# Patient Record
Sex: MALE | Race: WHITE
[De-identification: names, ages, dates, MRNs, and addresses within clinical notes are randomized per-mention and may not be internally consistent; named-entity substitution may affect disease eponyms.]

---

## 2020-11-16 ENCOUNTER — HOSPITAL ENCOUNTER (OUTPATIENT)
Age: 80
End: 2020-11-16
Payer: SELF-PAY

## 2020-11-16 DIAGNOSIS — M99.03: Primary | ICD-10-CM

## 2020-11-16 DIAGNOSIS — S33.5XXA: ICD-10-CM

## 2020-11-16 PROCEDURE — 72148 MRI LUMBAR SPINE W/O DYE: CPT

## 2021-06-10 ENCOUNTER — APPOINTMENT (OUTPATIENT)
Dept: CT IMAGING | Age: 81
DRG: 062 | End: 2021-06-10
Payer: MEDICARE

## 2021-06-10 ENCOUNTER — APPOINTMENT (OUTPATIENT)
Dept: GENERAL RADIOLOGY | Age: 81
DRG: 062 | End: 2021-06-10
Payer: MEDICARE

## 2021-06-10 ENCOUNTER — HOSPITAL ENCOUNTER (INPATIENT)
Age: 81
LOS: 5 days | Discharge: NURSING, REHAB OR LONG-TERM CARE | DRG: 062 | End: 2021-06-15
Attending: EMERGENCY MEDICINE | Admitting: INTERNAL MEDICINE
Payer: MEDICARE

## 2021-06-10 DIAGNOSIS — I65.23 STENOSIS OF BOTH INTERNAL CAROTID ARTERIES: ICD-10-CM

## 2021-06-10 DIAGNOSIS — I63.9 CEREBROVASCULAR ACCIDENT (CVA), UNSPECIFIED MECHANISM (HCC): Primary | ICD-10-CM

## 2021-06-10 LAB
ACETAMINOPHEN LEVEL: <5 MCG/ML (ref 10–30)
ALBUMIN SERPL-MCNC: 4 G/DL (ref 3.5–5.2)
ALP BLD-CCNC: 96 U/L (ref 40–129)
ALT SERPL-CCNC: 14 U/L (ref 0–40)
AMPHETAMINE SCREEN, URINE: NOT DETECTED
ANION GAP SERPL CALCULATED.3IONS-SCNC: 12 MMOL/L (ref 7–16)
APTT: 27.2 SEC (ref 24.5–35.1)
AST SERPL-CCNC: 22 U/L (ref 0–39)
BARBITURATE SCREEN URINE: NOT DETECTED
BASOPHILS ABSOLUTE: 0.06 E9/L (ref 0–0.2)
BASOPHILS RELATIVE PERCENT: 0.7 % (ref 0–2)
BENZODIAZEPINE SCREEN, URINE: NOT DETECTED
BILIRUB SERPL-MCNC: 0.4 MG/DL (ref 0–1.2)
BILIRUBIN URINE: NEGATIVE
BLOOD, URINE: NEGATIVE
BUN BLDV-MCNC: 21 MG/DL (ref 6–23)
CALCIUM SERPL-MCNC: 9.4 MG/DL (ref 8.6–10.2)
CANNABINOID SCREEN URINE: NOT DETECTED
CHLORIDE BLD-SCNC: 102 MMOL/L (ref 98–107)
CLARITY: CLEAR
CO2: 25 MMOL/L (ref 22–29)
COCAINE METABOLITE SCREEN URINE: NOT DETECTED
COLOR: YELLOW
CREAT SERPL-MCNC: 1.2 MG/DL (ref 0.7–1.2)
EKG ATRIAL RATE: 74 BPM
EKG P AXIS: 53 DEGREES
EKG P-R INTERVAL: 168 MS
EKG Q-T INTERVAL: 414 MS
EKG QRS DURATION: 126 MS
EKG QTC CALCULATION (BAZETT): 459 MS
EKG R AXIS: -18 DEGREES
EKG T AXIS: 23 DEGREES
EKG VENTRICULAR RATE: 74 BPM
EOSINOPHILS ABSOLUTE: 0.06 E9/L (ref 0.05–0.5)
EOSINOPHILS RELATIVE PERCENT: 0.7 % (ref 0–6)
ETHANOL: <10 MG/DL (ref 0–0.08)
FENTANYL SCREEN, URINE: NOT DETECTED
GFR AFRICAN AMERICAN: >60
GFR NON-AFRICAN AMERICAN: 58 ML/MIN/1.73
GLUCOSE BLD-MCNC: 95 MG/DL (ref 74–99)
GLUCOSE URINE: NEGATIVE MG/DL
HCT VFR BLD CALC: 44.4 % (ref 37–54)
HEMOGLOBIN: 14.3 G/DL (ref 12.5–16.5)
IMMATURE GRANULOCYTES #: 0.06 E9/L
IMMATURE GRANULOCYTES %: 0.7 % (ref 0–5)
INR BLD: 1.1
KETONES, URINE: NEGATIVE MG/DL
LEUKOCYTE ESTERASE, URINE: NEGATIVE
LYMPHOCYTES ABSOLUTE: 1.23 E9/L (ref 1.5–4)
LYMPHOCYTES RELATIVE PERCENT: 14.2 % (ref 20–42)
Lab: NORMAL
MAGNESIUM: 2.1 MG/DL (ref 1.6–2.6)
MCH RBC QN AUTO: 29.7 PG (ref 26–35)
MCHC RBC AUTO-ENTMCNC: 32.2 % (ref 32–34.5)
MCV RBC AUTO: 92.3 FL (ref 80–99.9)
METER GLUCOSE: 94 MG/DL (ref 74–99)
METHADONE SCREEN, URINE: NOT DETECTED
MONOCYTES ABSOLUTE: 0.95 E9/L (ref 0.1–0.95)
MONOCYTES RELATIVE PERCENT: 10.9 % (ref 2–12)
NEUTROPHILS ABSOLUTE: 6.32 E9/L (ref 1.8–7.3)
NEUTROPHILS RELATIVE PERCENT: 72.8 % (ref 43–80)
NITRITE, URINE: NEGATIVE
OPIATE SCREEN URINE: NOT DETECTED
OXYCODONE URINE: NOT DETECTED
PDW BLD-RTO: 12.8 FL (ref 11.5–15)
PH UA: 7 (ref 5–9)
PHENCYCLIDINE SCREEN URINE: NOT DETECTED
PLATELET # BLD: 331 E9/L (ref 130–450)
PMV BLD AUTO: 9.7 FL (ref 7–12)
POTASSIUM SERPL-SCNC: 4.6 MMOL/L (ref 3.5–5)
PROCALCITONIN: 0.07 NG/ML (ref 0–0.08)
PROTEIN UA: NEGATIVE MG/DL
PROTHROMBIN TIME: 11.5 SEC (ref 9.3–12.4)
RBC # BLD: 4.81 E12/L (ref 3.8–5.8)
SALICYLATE, SERUM: <0.3 MG/DL (ref 0–30)
SARS-COV-2, NAAT: NOT DETECTED
SODIUM BLD-SCNC: 139 MMOL/L (ref 132–146)
SPECIFIC GRAVITY UA: 1.01 (ref 1–1.03)
TOTAL PROTEIN: 7.2 G/DL (ref 6.4–8.3)
TRICYCLIC ANTIDEPRESSANTS SCREEN SERUM: NEGATIVE NG/ML
TROPONIN, HIGH SENSITIVITY: 34 NG/L (ref 0–11)
TROPONIN, HIGH SENSITIVITY: 38 NG/L (ref 0–11)
UROBILINOGEN, URINE: 0.2 E.U./DL
WBC # BLD: 8.7 E9/L (ref 4.5–11.5)

## 2021-06-10 PROCEDURE — 84484 ASSAY OF TROPONIN QUANT: CPT

## 2021-06-10 PROCEDURE — 80143 DRUG ASSAY ACETAMINOPHEN: CPT

## 2021-06-10 PROCEDURE — 99284 EMERGENCY DEPT VISIT MOD MDM: CPT

## 2021-06-10 PROCEDURE — 0042T CT BRAIN PERFUSION: CPT | Performed by: RADIOLOGY

## 2021-06-10 PROCEDURE — 84145 PROCALCITONIN (PCT): CPT

## 2021-06-10 PROCEDURE — 6360000002 HC RX W HCPCS

## 2021-06-10 PROCEDURE — 85610 PROTHROMBIN TIME: CPT

## 2021-06-10 PROCEDURE — 70498 CT ANGIOGRAPHY NECK: CPT | Performed by: RADIOLOGY

## 2021-06-10 PROCEDURE — 2000000000 HC ICU R&B

## 2021-06-10 PROCEDURE — 83735 ASSAY OF MAGNESIUM: CPT

## 2021-06-10 PROCEDURE — 0042T CT BRAIN PERFUSION: CPT

## 2021-06-10 PROCEDURE — 70496 CT ANGIOGRAPHY HEAD: CPT

## 2021-06-10 PROCEDURE — 80053 COMPREHEN METABOLIC PANEL: CPT

## 2021-06-10 PROCEDURE — 82077 ASSAY SPEC XCP UR&BREATH IA: CPT

## 2021-06-10 PROCEDURE — 2580000003 HC RX 258

## 2021-06-10 PROCEDURE — 6370000000 HC RX 637 (ALT 250 FOR IP): Performed by: INTERNAL MEDICINE

## 2021-06-10 PROCEDURE — 93010 ELECTROCARDIOGRAM REPORT: CPT | Performed by: INTERNAL MEDICINE

## 2021-06-10 PROCEDURE — 36415 COLL VENOUS BLD VENIPUNCTURE: CPT

## 2021-06-10 PROCEDURE — 82962 GLUCOSE BLOOD TEST: CPT

## 2021-06-10 PROCEDURE — 80307 DRUG TEST PRSMV CHEM ANLYZR: CPT

## 2021-06-10 PROCEDURE — 71045 X-RAY EXAM CHEST 1 VIEW: CPT

## 2021-06-10 PROCEDURE — 2500000003 HC RX 250 WO HCPCS: Performed by: EMERGENCY MEDICINE

## 2021-06-10 PROCEDURE — 70498 CT ANGIOGRAPHY NECK: CPT

## 2021-06-10 PROCEDURE — 85730 THROMBOPLASTIN TIME PARTIAL: CPT

## 2021-06-10 PROCEDURE — 87635 SARS-COV-2 COVID-19 AMP PRB: CPT

## 2021-06-10 PROCEDURE — 6360000002 HC RX W HCPCS: Performed by: EMERGENCY MEDICINE

## 2021-06-10 PROCEDURE — 6360000002 HC RX W HCPCS: Performed by: INTERNAL MEDICINE

## 2021-06-10 PROCEDURE — 85025 COMPLETE CBC W/AUTO DIFF WBC: CPT

## 2021-06-10 PROCEDURE — 70450 CT HEAD/BRAIN W/O DYE: CPT

## 2021-06-10 PROCEDURE — 6360000004 HC RX CONTRAST MEDICATION: Performed by: RADIOLOGY

## 2021-06-10 PROCEDURE — 99291 CRITICAL CARE FIRST HOUR: CPT | Performed by: PSYCHIATRY & NEUROLOGY

## 2021-06-10 PROCEDURE — 70496 CT ANGIOGRAPHY HEAD: CPT | Performed by: RADIOLOGY

## 2021-06-10 PROCEDURE — 2580000003 HC RX 258: Performed by: INTERNAL MEDICINE

## 2021-06-10 PROCEDURE — 2500000003 HC RX 250 WO HCPCS

## 2021-06-10 PROCEDURE — 80179 DRUG ASSAY SALICYLATE: CPT

## 2021-06-10 PROCEDURE — 93005 ELECTROCARDIOGRAM TRACING: CPT | Performed by: EMERGENCY MEDICINE

## 2021-06-10 PROCEDURE — C9113 INJ PANTOPRAZOLE SODIUM, VIA: HCPCS | Performed by: INTERNAL MEDICINE

## 2021-06-10 PROCEDURE — 81003 URINALYSIS AUTO W/O SCOPE: CPT

## 2021-06-10 PROCEDURE — 2580000003 HC RX 258: Performed by: EMERGENCY MEDICINE

## 2021-06-10 PROCEDURE — 96374 THER/PROPH/DIAG INJ IV PUSH: CPT

## 2021-06-10 RX ORDER — SODIUM CHLORIDE 9 MG/ML
25 INJECTION, SOLUTION INTRAVENOUS PRN
Status: DISCONTINUED | OUTPATIENT
Start: 2021-06-10 | End: 2021-06-10

## 2021-06-10 RX ORDER — LABETALOL HYDROCHLORIDE 5 MG/ML
5 INJECTION, SOLUTION INTRAVENOUS ONCE
Status: COMPLETED | OUTPATIENT
Start: 2021-06-10 | End: 2021-06-10

## 2021-06-10 RX ORDER — ERGOCALCIFEROL 1.25 MG/1
50000 CAPSULE ORAL WEEKLY
COMMUNITY

## 2021-06-10 RX ORDER — 0.9 % SODIUM CHLORIDE 0.9 %
50 INTRAVENOUS SOLUTION INTRAVENOUS ONCE
Status: DISCONTINUED | OUTPATIENT
Start: 2021-06-10 | End: 2021-06-10

## 2021-06-10 RX ORDER — SODIUM CHLORIDE 0.9 % (FLUSH) 0.9 %
5-40 SYRINGE (ML) INJECTION PRN
Status: DISCONTINUED | OUTPATIENT
Start: 2021-06-10 | End: 2021-06-11

## 2021-06-10 RX ORDER — SODIUM CHLORIDE 9 MG/ML
25 INJECTION, SOLUTION INTRAVENOUS PRN
Status: DISCONTINUED | OUTPATIENT
Start: 2021-06-10 | End: 2021-06-15 | Stop reason: HOSPADM

## 2021-06-10 RX ORDER — PANTOPRAZOLE SODIUM 40 MG/10ML
40 INJECTION, POWDER, LYOPHILIZED, FOR SOLUTION INTRAVENOUS DAILY
Status: DISCONTINUED | OUTPATIENT
Start: 2021-06-10 | End: 2021-06-12

## 2021-06-10 RX ORDER — ONDANSETRON 2 MG/ML
4 INJECTION INTRAMUSCULAR; INTRAVENOUS EVERY 6 HOURS PRN
Status: DISCONTINUED | OUTPATIENT
Start: 2021-06-10 | End: 2021-06-15 | Stop reason: HOSPADM

## 2021-06-10 RX ORDER — 0.9 % SODIUM CHLORIDE 0.9 %
50 INTRAVENOUS SOLUTION INTRAVENOUS ONCE
Status: COMPLETED | OUTPATIENT
Start: 2021-06-10 | End: 2021-06-10

## 2021-06-10 RX ORDER — HYDRALAZINE HYDROCHLORIDE 20 MG/ML
10 INJECTION INTRAMUSCULAR; INTRAVENOUS EVERY 30 MIN PRN
Status: DISCONTINUED | OUTPATIENT
Start: 2021-06-10 | End: 2021-06-15 | Stop reason: HOSPADM

## 2021-06-10 RX ORDER — POLYETHYLENE GLYCOL 3350 17 G/17G
17 POWDER, FOR SOLUTION ORAL DAILY PRN
Status: DISCONTINUED | OUTPATIENT
Start: 2021-06-10 | End: 2021-06-10

## 2021-06-10 RX ORDER — DEXTROSE MONOHYDRATE 25 G/50ML
12.5 INJECTION, SOLUTION INTRAVENOUS
Status: DISCONTINUED | OUTPATIENT
Start: 2021-06-10 | End: 2021-06-10

## 2021-06-10 RX ORDER — ATORVASTATIN CALCIUM 40 MG/1
80 TABLET, FILM COATED ORAL NIGHTLY
Status: DISCONTINUED | OUTPATIENT
Start: 2021-06-10 | End: 2021-06-15 | Stop reason: HOSPADM

## 2021-06-10 RX ORDER — SENNA AND DOCUSATE SODIUM 50; 8.6 MG/1; MG/1
1 TABLET, FILM COATED ORAL 2 TIMES DAILY
Status: DISCONTINUED | OUTPATIENT
Start: 2021-06-10 | End: 2021-06-15 | Stop reason: HOSPADM

## 2021-06-10 RX ORDER — SODIUM CHLORIDE 9 MG/ML
10 INJECTION INTRAVENOUS DAILY
Status: DISCONTINUED | OUTPATIENT
Start: 2021-06-10 | End: 2021-06-12

## 2021-06-10 RX ORDER — LABETALOL HYDROCHLORIDE 5 MG/ML
10 INJECTION, SOLUTION INTRAVENOUS EVERY 30 MIN PRN
Status: DISCONTINUED | OUTPATIENT
Start: 2021-06-10 | End: 2021-06-15 | Stop reason: HOSPADM

## 2021-06-10 RX ORDER — SODIUM CHLORIDE 0.9 % (FLUSH) 0.9 %
10 SYRINGE (ML) INJECTION
Status: ACTIVE | OUTPATIENT
Start: 2021-06-10 | End: 2021-06-10

## 2021-06-10 RX ORDER — HYDROCHLOROTHIAZIDE 25 MG/1
25 TABLET ORAL DAILY
COMMUNITY

## 2021-06-10 RX ORDER — DEXTROSE MONOHYDRATE 25 G/50ML
12.5 INJECTION, SOLUTION INTRAVENOUS
Status: ACTIVE | OUTPATIENT
Start: 2021-06-10 | End: 2021-06-10

## 2021-06-10 RX ORDER — SODIUM CHLORIDE 0.9 % (FLUSH) 0.9 %
5-40 SYRINGE (ML) INJECTION EVERY 12 HOURS SCHEDULED
Status: DISCONTINUED | OUTPATIENT
Start: 2021-06-10 | End: 2021-06-11

## 2021-06-10 RX ORDER — POLYETHYLENE GLYCOL 3350 17 G/17G
17 POWDER, FOR SOLUTION ORAL DAILY
Status: DISCONTINUED | OUTPATIENT
Start: 2021-06-10 | End: 2021-06-15 | Stop reason: HOSPADM

## 2021-06-10 RX ORDER — SODIUM CHLORIDE 0.9 % (FLUSH) 0.9 %
5-40 SYRINGE (ML) INJECTION EVERY 12 HOURS SCHEDULED
Status: DISCONTINUED | OUTPATIENT
Start: 2021-06-10 | End: 2021-06-15 | Stop reason: HOSPADM

## 2021-06-10 RX ORDER — ONDANSETRON 4 MG/1
4 TABLET, ORALLY DISINTEGRATING ORAL EVERY 8 HOURS PRN
Status: DISCONTINUED | OUTPATIENT
Start: 2021-06-10 | End: 2021-06-15 | Stop reason: HOSPADM

## 2021-06-10 RX ORDER — SODIUM CHLORIDE 0.9 % (FLUSH) 0.9 %
5-40 SYRINGE (ML) INJECTION PRN
Status: DISCONTINUED | OUTPATIENT
Start: 2021-06-10 | End: 2021-06-15 | Stop reason: HOSPADM

## 2021-06-10 RX ADMIN — SODIUM CHLORIDE, PRESERVATIVE FREE 10 ML: 5 INJECTION INTRAVENOUS at 20:30

## 2021-06-10 RX ADMIN — SODIUM CHLORIDE 50 ML: 9 INJECTION, SOLUTION INTRAVENOUS at 13:53

## 2021-06-10 RX ADMIN — LABETALOL HYDROCHLORIDE 5 MG: 5 INJECTION INTRAVENOUS at 12:48

## 2021-06-10 RX ADMIN — PANTOPRAZOLE SODIUM 40 MG: 40 INJECTION, POWDER, FOR SOLUTION INTRAVENOUS at 20:30

## 2021-06-10 RX ADMIN — IOPAMIDOL 100 ML: 755 INJECTION, SOLUTION INTRAVENOUS at 12:02

## 2021-06-10 RX ADMIN — ATORVASTATIN CALCIUM 80 MG: 40 TABLET, FILM COATED ORAL at 20:29

## 2021-06-10 RX ADMIN — ALTEPLASE 80.8 MG: KIT at 12:55

## 2021-06-10 ASSESSMENT — PAIN SCALES - GENERAL
PAINLEVEL_OUTOF10: 0
PAINLEVEL_OUTOF10: 0

## 2021-06-10 NOTE — PROGRESS NOTES
Patient seen in ED room 1 by Dr Billy Ariza and myself regarding the order for carotid stent. Risks and benefits of procedure were discussed with the patient as well as other surgical options. Patient was unsure what he would like to do at this time and states that his wife and son will be here in about an hour to two hours and he would like to discuss it with them first. We will follow up on patient's decision.

## 2021-06-10 NOTE — H&P
Inpatient H&P      PCP:  No primary care provider on file. Admitting Physician:  No admitting provider for patient encounter. Consultants: Critical care, interventional radiology. Chief Complaint:    Chief Complaint   Patient presents with    Extremity Weakness     left arm/ left leg feels heavy. LKW per EMS 56       History of Present Illness  Mustapha Sinha is a 80 y.o. male who presents to [facility] ER complaining of left-sided weakness. Mustapha Sinha has a past medical history that includes hypertension, sciatica, lung nodule    Sylvanus patient presented to the ER with sudden onset of left-sided weakness and numbness which began earlier this morning around 1030. He was brought to the ER. Stroke alert was called he has a history of hypertension. In the ER he had an NIH stroke score of 7. CT of the head showed findings suspicious for small region of ischemia in the right temporal lobe. CTA showed right carotid stenosis of 90%. Chest x-ray showed bibasilar pulmonary opacities which may represent atelectasis. He denies any chest pain or shortness of breath. He was given tPA in the ER. He will be admitted to neuro ICU. Plans are for possibly carotid stent. ER Course  Upon presentation to the ER, routine labwork was performed which revealed troponin of 34. Imaging results are as outlined below in the Imaging section of this note. EKG revealed normal sinus rhythm, right bundle branch block, no prior EKGs. Upon arrival to the ER, patient was 188/94 and tachycardic at 100. .  The patient received tPA in the emergency room and was admitted to ICU under the care of [place] [id number] Jose Robertson Admission -   None In EMR    Last Echocardiogram -   None in EMR     ED TRIAGE VITALS  BP: (!) 155/85, Temp: 98 °F (36.7 °C), Pulse: 75, Resp: 16, SpO2: 96 %    Vitals:    06/10/21 1200 06/10/21 1215 06/10/21 1230 06/10/21 1253   BP:  (!) 166/89 (!) 183/94 (!) 155/85   Pulse:  85 91 75   Resp:  13 21 16   Temp:       SpO2:    96%   Weight: 220 lb (99.8 kg)            Histories  History reviewed. No pertinent past medical history. History reviewed. No pertinent surgical history. History reviewed. No pertinent family history. Home Medications  Prior to Admission medications    Not on File       Allergies  Patient has no known allergies. Social Hx  Social History     Socioeconomic History    Marital status: Not on file     Spouse name: Not on file    Number of children: Not on file    Years of education: Not on file    Highest education level: Not on file   Occupational History    Not on file   Tobacco Use    Smoking status: Never Smoker    Smokeless tobacco: Never Used   Vaping Use    Vaping Use: Never used   Substance and Sexual Activity    Alcohol use: Not Currently    Drug use: Never    Sexual activity: Not on file   Other Topics Concern    Not on file   Social History Narrative    Not on file     Social Determinants of Health     Financial Resource Strain:     Difficulty of Paying Living Expenses:    Food Insecurity:     Worried About Running Out of Food in the Last Year:     [address] in the Last Year:    Transportation Needs:     Lack of Transportation (Medical):      Lack of Transportation (Non-Medical):    Physical Activity:     Days of Exercise per Week:     Minutes of Exercise per Session:    Stress:     Feeling of Stress :    Social Connections:     Frequency of Communication with Friends and Family:     Frequency of Social Gatherings with Friends and Family:     Attends [employer] Services:     Active Member of Clubs or Organizations:     Attends Club or Organization Meetings:     Marital Status:    Intimate Partner Violence:     Fear of Current or Ex-Partner:     Emotionally Abused:     Physically Abused:     Sexually Abused:        Review of Systems  All bolded are positive; please see HPI  General:  Fever, chills, diaphoresis, fatigue, malaise, night sweats, weight loss  Psychological:  Anxiety, disorientation, hallucinations. ENT:  Epistaxis, headaches, vertigo, visual changes. Cardiovascular:  Chest pain, irregular heartbeats, palpitations, paroxysmal nocturnal dyspnea. Respiratory:  Shortness of breath, coughing, sputum production, hemoptysis, wheezing, orthopnea. Gastrointestinal:  Nausea, vomiting, diarrhea, heartburn, constipation, abdominal pain, hematemesis, hematochezia, melena, acholic stools  Genito-Urinary:  Dysuria, urgency, frequency, hematuria  Musculoskeletal:  Joint pain, joint stiffness, joint swelling, muscle pain  Neurology:  Headache, focal neurological deficits, weakness, numbness, paresthesia  Derm:  Rashes, ulcers, excoriations, bruising  Extremities:  Decreased ROM, peripheral edema, mottling    Physical Examination  Vitals:  BP (!) 155/85   Pulse 75   Temp 98 °F (36.7 °C)   Resp 16   Wt 220 lb (99.8 kg)   SpO2 96%   General Appearance:  awake, alert, and oriented to person, place, time, and purpose; appears stated age and cooperative; no apparent distress no labored breathing  HEENT:  NCAT; PERRL; conjunctiva pink, sclera clear mild left sided facial droop  Neck:  no adenopathy, bruit, JVD, tenderness, masses, or nodules; supple, symmetrical, trachea midline, thyroid not enlarged  Lung:  clear to auscultation bilaterally; no use of accessory muscles; no rhonchi, rales, or crackles  Heart:  regular rate and regular rhythm without murmur, rub, or gallop  Abdomen:  soft, nontender, nondistended; normoactive bowel sounds; no organomegaly  Extremities:  extremities normal, atraumatic, no cyanosis or edema  Musculokeletal:  no joint swelling, no muscle tenderness. ROM normal in all joints of extremities.    Neurologic:  mental status A&Ox3, thought content appropriate; CN II-XII grossly intact; sensation intact, motor strength 5/5 globally; no slurred speech mild left sided weakness upper extremity and decreased sensation, mild aphasia       Laboratory Data  Recent Results (from the past 24 hour(s))   POCT Glucose    Collection Time: 06/10/21 11:52 AM   Result Value Ref Range    Meter Glucose 94 74 - 99 mg/dL   Protime-INR    Collection Time: 06/10/21 12:00 PM   Result Value Ref Range    Protime 11.5 9.3 - 12.4 sec    INR 1.1    APTT    Collection Time: 06/10/21 12:00 PM   Result Value Ref Range    aPTT 27.2 24.5 - 35.1 sec   Urine Drug Screen    Collection Time: 06/10/21 12:14 PM   Result Value Ref Range    Drug Screen Comment: see below    CBC Auto Differential    Collection Time: 06/10/21 12:30 PM   Result Value Ref Range    WBC 8.7 4.5 - 11.5 E9/L    RBC 4.81 3.80 - 5.80 E12/L    Hemoglobin 14.3 12.5 - 16.5 g/dL    Hematocrit 44.4 37.0 - 54.0 %    MCV 92.3 80.0 - 99.9 fL    MCH 29.7 26.0 - 35.0 pg    MCHC 32.2 32.0 - 34.5 %    RDW 12.8 11.5 - 15.0 fL    Platelets 623 093 - 662 E9/L    MPV 9.7 7.0 - 12.0 fL    Neutrophils % 72.8 43.0 - 80.0 %    Immature Granulocytes % 0.7 0.0 - 5.0 %    Lymphocytes % 14.2 (L) 20.0 - 42.0 %    Monocytes % 10.9 2.0 - 12.0 %    Eosinophils % 0.7 0.0 - 6.0 %    Basophils % 0.7 0.0 - 2.0 %    Neutrophils Absolute 6.32 1.80 - 7.30 E9/L    Immature Granulocytes # 0.06 E9/L    Lymphocytes Absolute 1.23 (L) 1.50 - 4.00 E9/L    Monocytes Absolute 0.95 0.10 - 0.95 E9/L    Eosinophils Absolute 0.06 0.05 - 0.50 E9/L    Basophils Absolute 0.06 0.00 - 0.20 E9/L       Imaging  CT HEAD WO CONTRAST    Result Date: 6/10/2021  EXAMINATION: CT OF THE HEAD WITHOUT CONTRAST  6/10/2021 12:01 pm TECHNIQUE: CT of the head was performed without the administration of intravenous contrast. Dose modulation, iterative reconstruction, and/or weight based adjustment of the mA/kV was utilized to reduce the radiation dose to as low as reasonably achievable. COMPARISON: None. HISTORY: ORDERING SYSTEM PROVIDED HISTORY: cva TECHNOLOGIST PROVIDED HISTORY: Has a \"code stroke\" or \"stroke alert\" been called? ->Yes Reason for exam:->cva Decision Support Exception - unselect if not a suspected or confirmed emergency medical condition->Emergency Medical Condition (MA) What reading provider will be dictating this exam?->[facility] FINDINGS: BRAIN/VENTRICLES: There is no acute intracranial hemorrhage, mass effect or midline shift. No abnormal extra-axial fluid collection. The gray-white differentiation is maintained without evidence of an acute infarct. There is no evidence of hydrocephalus. There is mild chronic ischemic/degenerative changes in the white matter. ORBITS: The visualized portion of the orbits demonstrate no acute abnormality. SINUSES: The visualized paranasal sinuses and mastoid air cells demonstrate no acute abnormality. SOFT TISSUES/SKULL:  No acute abnormality of the visualized skull or soft tissues. No acute intracranial abnormality. CTA NECK W CONTRAST    Result Date: 6/10/2021  Patient MRN:  [id number] : 1940 Age: 80 years Gender: Male Order Date:  6/10/2021 12:01 PM EXAM: CTA NECK W CONTRAST, CTA HEAD W CONTRAST NUMBER OF IMAGES:  36 INDICATION:  cva cva Decision Support Exception - unselect if not a suspected or confirmed emergency medical condition->Emergency Medical Condition (MA) What reading provider will be dictating this exam?->[facility] COMPARISON: None Technique: Low-dose CT  acquisition technique included one of following options; 1 . Automated exposure control, 2. Adjustment of MA and or KV according to patient's size or 3. Use of iterative reconstruction. Contiguous spiral images were obtained in the axial plane, following the administration of intravenous contrast using CT angiographic protocol. Sagittal and coronal images were reconstructed from the axial plane acquisition. Additional MIP reconstructions were presented to aid in the interpretation of this study. Images were obtained from the skull base cranially. There is mild calcified plaque identified in the vessels compatible with atherosclerotic disease.  There is aortic arch and great vessels demonstrate mild atherosclerotic disease. The right carotid is abnormal. There is  evidence for hemodynamically significant stenosis at the level the proximal internal carotid artery. By NASCET criteria estimated stenosis is 90% or greater The left carotid is unremarkable. The right vertebral artery is unremarkable. The left vertebral artery is unremarkable. The basilar artery is unremarkable. The middle cerebral arteries are unremarkable. The anterior cerebral arteries are unremarkable. The posterior cerebral arteries are unremarkable. 1.  Severe atherosclerotic disease . 2. Estimated stenosis of the proximal right and left internal carotid artery by NASCET criteria is greater than 90% on the right This study was analyzed by the Viz. ai algorithm. CT BRAIN PERFUSION    Result Date: 6/10/2021  Patient MRN: [id number] : 1940 Age:  80 years Gender: Male Order Date: 6/10/2021 12:01 PM Exam: CT BRAIN PERFUSION Number of Images: 333 views Indication:   cva cva Decision Support Exception - unselect if not a suspected or confirmed emergency medical condition->Emergency Medical Condition (MA) What reading provider will be dictating this exam?->[facility] Comparison: None. Findings: Perfusion images demonstrate symmetric blood volume Blood flow images demonstrate asymmetric blood flow There is a suggestion of ischemic penumbra in the distribution of the right temporal lobe There is no significant core infarct identified. Findings suspicious for small region of ischemia in the right temporal lobe This study was analyzed by the [address]. ai algorithm.      CTA HEAD W CONTRAST    Result Date: 6/10/2021  Patient MRN:  [id number] : 1940 Age: 80 years Gender: Male Order Date:  6/10/2021 12:01 PM EXAM: CTA NECK W CONTRAST, CTA HEAD W CONTRAST NUMBER OF IMAGES:  36 INDICATION:  cva cva Decision Support Exception - unselect if not a suspected or confirmed emergency medical condition->Emergency Medical Condition (MA) What reading provider will be dictating this exam?->[facility] COMPARISON: None Technique: Low-dose CT  acquisition technique included one of following options; 1 . Automated exposure control, 2. Adjustment of MA and or KV according to patient's size or 3. Use of iterative reconstruction. Contiguous spiral images were obtained in the axial plane, following the administration of intravenous contrast using CT angiographic protocol. Sagittal and coronal images were reconstructed from the axial plane acquisition. Additional MIP reconstructions were presented to aid in the interpretation of this study. Images were obtained from the skull base cranially. There is mild calcified plaque identified in the vessels compatible with atherosclerotic disease. There is aortic arch and great vessels demonstrate mild atherosclerotic disease. The right carotid is abnormal. There is  evidence for hemodynamically significant stenosis at the level the proximal internal carotid artery. By NASCET criteria estimated stenosis is 90% or greater The left carotid is unremarkable. The right vertebral artery is unremarkable. The left vertebral artery is unremarkable. The basilar artery is unremarkable. The middle cerebral arteries are unremarkable. The anterior cerebral arteries are unremarkable. The posterior cerebral arteries are unremarkable. 1.  Severe atherosclerotic disease . 2. Estimated stenosis of the proximal right and left internal carotid artery by NASCET criteria is greater than 90% on the right This study was analyzed by the Viz. ai algorithm. Assessment and Plan  Patient is a 80 y.o. male who presented with left-sided weakness. The active problem list is as follows:    · Acute R temporal lobe CVA- received TPA in the ER. Check echocardiogram.  Neurochecks. Check lipid panel. PT OT speech. Permissive hypertension.   · Right-sided carotid stenosis-IR consulted from the ER, plan is for carotid stent tomorrow. · Elevated troponin- denies chest pain. Continue to trend. EKG shows right bundle branch block. · ? Bibasilar opacities-atelectasis versus pneumonia. Check procalcitonin. · Hypertension    · Routine labs in the morning. · DVT prophylaxis. · Please see orders for further management and care.         Eleno Jackson,   DO  1:06 PM  6/10/2021

## 2021-06-10 NOTE — VIRTUAL HEALTH
Consults  Patient Location:  [address] [place] [address] Emergency Department    Provider Location ([facility]/State):   [address] [place] [address]    This virtual visit was conducted via interactive/real-time audio/video. [address] [facility] Stroke and Vascular Neurology Consult for  [address] Stroke Alert through [address] @ 12:02pm  6/10/2021 12:42 PM  Pt Name: Jimena Durham  MRN: [id number]  YOB: 1940  Date of evaluation: 6/10/2021  Primary Care Physician: No primary care provider on file. Reason for Evaluation: Stroke Evaluation with Discussion with Ed or primary team with Telemedicine and stroke evaluation with Review of imaging and labs    Jimena Durham is a 80 y.o. male with HTN, woke up well this AM, suddenly around 10:30am c/o left arm more the leg weakness and slurred speech. Pt is very certain that he woke up normally at 4:30am and his morning has been fairly routine and normal until this suddenly happen at 10:30am      LKW: 10:30am  NIH:  7    Allergies  has No Known Allergies. Medications  Prior to Admission medications    Not on File    Scheduled Meds:   labetalol  5 mg Intravenous Once    sodium chloride flush  5-40 mL Intravenous 2 times per day     Continuous Infusions:   sodium chloride       PRN Meds:.sodium chloride flush, sodium chloride flush, sodium chloride, dextrose  Past Medical History   has no past medical history on file.   Social History  Social History     Socioeconomic History    Marital status: Not on file     Spouse name: Not on file    Number of children: Not on file    Years of education: Not on file    Highest education level: Not on file   Occupational History    Not on file   Tobacco Use    Smoking status: Never Smoker    Smokeless tobacco: Never Used   Vaping Use    Vaping Use: Never used   Substance and Sexual Activity    Alcohol use: Not Currently    Drug use: stroke is likely coming from the severe high grade R ICA stenosis, there may be a small clot sitting on such a severe stenosis, such a stenosis require him to be treated in the immediate next 1-3 days post tPA. 7. Pt will benefit from dual antiplatelets loading 24 hours after tPA in preparation for a emergent stent placement. 8. Keep SBP below 180/100 in the first 24 hours of tPA, avoid dropping BP after the tPA as well until the R ICA stenosis is treated and opened with a stent. After stent placement, please keep the SBP below 130/90 for at least 24-48 hours to prevent perfusion injury, given the stroke setting this may be tricky, close neuro exam monitoring will be necessary to adjust the best BP parameter for this patient. 9. For the first 24 hours, only SCD for DVT prophylaxis. Discussed with ED Physician Dr Jennifre Rahman updated, Dr Geraldine Arenas from Neurointervention also updated. At least 55 min of critical care time spent through telemedicine robot at patient bedside (via interactive/real-time software) as patient is in imminent and life threatening deterioration with further treatment and evaluation. This Virtual Visit was conducted with patient's (and/or legal guardian's) consent, to provide telestroke consultation and necessary medical care.   Time spent examining patient, reviewing the images personally, reviewing the chart, perform high complexity decision making and speaking with the nursing staff regarding recommendations        Meghna Ross MD, MD   Stroke, Neurocritical Care And/or [address] [place] [address] Stroke [address] [address] [place]  Electronically signed 6/10/2021 at 12:42 PM

## 2021-06-10 NOTE — LETTER
[place] Department Medicaid  CERTIFICATION OF NECESSITY  FOR NON-EMERGENCY TRANSPORTATION   BY GROUND AMBULANCE      Individual Information   1. Name: Kyara Arias 2. [place] Medicaid Billing Number:    3. Address: [address] [address]      Transportation Provider Information   4. Provider Name: [facility]    4. [place] Medicaid Provider Number:  National Provider Identifier (NPI):      Certification  7. Criteria:  During transport, this individual requires:  [x] Medical treatment or continuous     supervision by an EMT. [] The administration or regulation of oxygen by another person. [] Supervised protective restraint. 8. Period Beginning Date: 06/15/2021   9. Length  [x] Not more than 1 day(s)  [] One Year     Additional Information Relevant to Certification   10. Comments or Explanations, If Necessary or Appropriate     Acute right temporal stroke, left hemiparesis, alert x3, impulsive, poor safety awareness     Certifying Practitioner Information   11. Name of Practitioner: Dr. Marylin Trujillo   12. [place] Medicaid Provider Number, If Applicable:  [address] Provider Identifier (NPI):      Signature Information   14. Date of Signature: 06/14/2021 15. Name of Person Signing: Timmy Crawley RN, BSN   16. Signature and Professional Designation: Timmy Crawley RN, BSN Discharge Planner     [facility] [address]  Rev. 7/2015        [address] [place] [address] Encounter Date/Time: 6/10/2021 [address] [place] [address] Account: [de-identified]    MRN: [id number]    Patient: Kyara Arias    Contact Serial #: [id number]      ENCOUNTER          Patient Class: I Private Enc?   No Unit RM BD: SEYZ 8SE [address] [facility] Service: Intermediate   Encounter DX: Acute CVA (cerebrovascul*   ADM Provider: Burt Schulte DO   Procedure:     ATT Provider: Burt Schulte DO   REF Provider:        Admission DX: Acute CVA (cerebrovascular accident) ([facility] [address] and DX codes: I63.9      PATIENT                 Name: Lin Kuhn : 1940 (80 yrs)   Address: [address] Sex: Male   [place] city: [place] [address]         Marital Status:    Employer: RETIRED         Hinduism: Gnosticist   Primary Care Provider:           Primary Phone: [employer] [address]   Contact Name Legal Guardian? Relationship to Patient Home Phone Work Phone   1. herojaden  2. Devin Newer No    Spouse  Child [phone]         GUARANTOR            Guarantor: Lin Kuhn     : 1940   Address: [address] [place] [address] Sex: Male     [place] [address]     Relation to Patient: Self       Home Phone: [phone]   Guarantor ID: [id number]       Work Phone:     Guarantor Employer: RETIRED         Status: RETIRED      COVERAGE        PRIMARY INSURANCE   Payor: MEDICARE Plan: MEDICARE PART A AND B   Payor Address: [place] [address]       Group Number:   Insurance Type: INDEMNITY   Subscriber Name: Romi Gonzalez : 1940   Subscriber ID: [id number] Pat. Rel. to Sub: Self   SECONDARY INSURANCE   Payor: [employer] [place] Plan: [employer] [place] SHABAZZ*   Payor Address:  [facility], [address] [place] [address]          Group Number:   Insurance Type: INDEMNITY   Subscriber Name: Romi Gonzalez : 1940   Subscriber ID: [id number] Pat.  Rel. to Sub: SELF

## 2021-06-10 NOTE — PROGRESS NOTES
Patient seen with staff in ED, patient s/p tpa, with right prox ica stenosis. Discussed options of medical management, carotid stent and CEA. Patient wife and son are on the way to hospital and patient wishes to wait until they arrive.

## 2021-06-10 NOTE — PROGRESS NOTES
[facility] Neuro  Intensive Care Unit  Critical Care Consult 6/10/2021    Date of Admission: 06/10/2021    CC: Follow up for stroke S/P tpa. [facility] COURSE/OVERNIGHT EVENTS:  06/10 79 yo man presented to ED after sudden onset of left sided weakness. While at work. LKW 10:30 am. Initial  BS 98. Initial l /94. NIH SS 7. Diagnostic imaging revealed small area of ischemia in the right temporal area & right ICA stenosis of 90%. Decision made for tpa. IR consulted for evaluation of right carotid stenting. PMH:   Past Medical History:   Diagnosis Date    Essential hypertension        PSH:   History reviewed. No pertinent surgical history. Home Medications:   Prior to Admission medications    Medication Sig Start Date End Date Taking? Authorizing Provider   vitamin D (ERGOCALCIFEROL) 1.25 MG (64582 UT) CAPS capsule Take 50,000 Units by mouth once a week   Yes Historical Provider, MD   hydroCHLOROthiazide (HYDRODIURIL) 25 MG tablet Take 25 mg by mouth daily   Yes Historical Provider, MD     Allergies:   No Known Allergies    Social History:  Social History     Socioeconomic History    Marital status:      Spouse name: Not on file    Number of children: Not on file    Years of education: Not on file    Highest education level: Not on file   Occupational History    Not on file   Tobacco Use    Smoking status: Never Smoker    Smokeless tobacco: Never Used   Vaping Use    Vaping Use: Never used   Substance and Sexual Activity    Alcohol use: Not Currently    Drug use: Never    Sexual activity: Not on file   Other Topics Concern    Not on file   Social History Narrative    Not on file     Social Determinants of Health     Financial Resource Strain:     Difficulty of Paying Living Expenses:    Food Insecurity:     Worried About Running Out of Food in the Last Year:     [address] in the Last Year:    Transportation Needs:     Lack of Transportation (Medical):      Lack of Transportation (Non-Medical):    Physical Activity:     Days of Exercise per Week:     Minutes of Exercise per Session:    Stress:     Feeling of Stress :    Social Connections:     Frequency of Communication with Friends and Family:     Frequency of Social Gatherings with Friends and Family:     Attends Orthodoxy Services:     Active Member of Clubs or Organizations:     Attends Club or Organization Meetings:     Marital Status:    Intimate Partner Violence:     Fear of Current or Ex-Partner:     Emotionally Abused:     Physically Abused:     Sexually Abused:      Family History:   History reviewed. No pertinent family history. VITAL SIGNS:   BP (!) 188/95   Pulse 78   Temp 98 °F (36.7 °C)   Resp 16   Wt 220 lb (99.8 kg)   SpO2 96%     PHYSICAL EXAM:    General appearance:  Comfortable. Pain Description: none    NEUROLOGIC:      GCS:  15  4 - Opens eyes on own   6 - Follows simple motor commands  5 - Alert and oriented  Oriented X 4. Pupil size:  Left 4 mm    Right 4 mm  Pupil reaction: Yes   PERRLA  Wiggles fingers: Left Yes Right Yes  Hand grasp:   Left decreased    Right normal  Wiggles toes: Left Yes    Right Yes  Plantar flexion: Left decreased   Right normal    CONSTITUTIONAL: No acute distress, lying in hospital bed. CARDIOVASCULAR: S1 S2, regular rate, regular rhythm, no murmur/gallop/rub. Monitor:   NSR. PULMONARY: Bilateral clear. No rhonchi/rales/wheezes, no use of accessory muscles. Room air. RENAL:   Voids. ABDOMEN: Soft, nontender, nondistended, nontympanic, normal bowel sounds. .  Denies any nausea or vomiting. MUSCULOSKELETAL: Moves all extremities purposefully     Left sided weakness. SKIN/EXTREMITIES: No rashes/ecchymosis, no edema/clubbing, warm/dry, good capillary refill. LINES:   Peripheral IVs.     ASSESSMENT & PLAN   Active Problems:    Acute CVA (cerebrovascular accident) ([facility] [address]  Resolved Problems:    * No resolved hospital problems.  * Neuro:  Right temporal ischemic stroke. Right ICA with 90% stenosis. Monitor neuro status. Neurology following. Stroke education. Stroke protocol. Speech therapy. CV:  Hypertensive at arrival.  No PMH. Monitor hemodynamics. BP goal systolic less than 219 mm Hg. PRN hydralzine & labetalol. Statin. Echo pending. Pulm: At risk for respiratory insufficiency. Monitor RR & SpO2. O2 as needed. Encourage cough, SMI  & deep breathing. GI: will need swallow evaluaiton. Monitor bowel function. NPO. Will need bedside swallow evaluation. Zofran. Bowel regime. Renal:  No acute issues. Monitor BUN & Cr, electrolytes & replace as needed. Monitor I & O.    Voids. ID: No acute issues  Endocrine: No acute issues. Monitor BS.    MSK: Left sided weakness. Deconditioned.   ROM. Turn & reposition. PT & OT   pending recommendations;  Monitor for skin breakdown. Heme: No acute issues. Monitor CBC. Bowel regime: Senna and Glycolax  Pain control/Sedation: Tylenol  DVT prophylaxis: SCD and No Lovenox/Heparin at this time due to TPA. GI prophylaxis: Protonix  Ancillary consults:  Neurology, Medicine and Critical care. Patient:  Questions answered. Support given. Code status:  Full code. Disposition:Admitted to ICU.       Electronically signed by Elisabeth Silverio RN MSN APRN-NP [facility] NP  [facility] CCRN 6/10/2021 3:14 PM

## 2021-06-10 NOTE — ED PROVIDER NOTES
Department of Emergency Medicine   ED  Provider Note  Admit Date/RoomTime: 6/10/2021 11:47 AM  ED Room: 01/01        6/10/21  11:54 AM EDT    Stroke Alert called: Yes    HISTORY OF PRESENT ILLNESS:  (Nurses Notes Reviewed)    Chief Complaint:   Extremity Weakness (left arm/ left leg feels heavy. LKW per EMS 21 )      Source of history provided by:  patient and EMS personnel. History/Exam Limitations: acuity of illness. Henrique Gupta is a 80 y.o. old male presenting to the emergency department by EMS, with sudden onset of left sided weakness and numbness, which began at 1030am @ work. Last known well time: 1030. The episode occurred at work. Since recognized the symptoms have been persistent. He has history of HTN. He has stroke risk factors of: hypertension. There have been associated symptoms of left-sided numbness, left arm weakness, some difficulty speaking. There has been no history of recent trauma. Patient presents via EMS for concern for stroke. He was at work, works as a  and was loading his truck when he had sudden onset of left-sided numbness and difficulty moving his left arm. Prehospital blood glucose was 98, he was hypertensive. Seen upon arrival    Code Status on file: No Order. NIH Stroke Scale at time of initial evaluation:   Last known well time: 1030am  NIH Stroke Scale at time of initial evaluation: 1150  1A: Level of Consciousness 0 - alert; keenly responsive   1B: Ask Month and Age 0 - answers both questions correctly   1C:  Tell Patient To Open and Close Eyes, then Hand  Squeeze 0 - performs both tasks correctly   2: Test Horizontal Extraocular Movements 0 - normal   3: Test Visual Fields 0 - no visual loss   4: Test Facial Palsy 1 - minor paralysis (flattened nasolabial fold, asymmetric on smiling)   5A: Test Left Arm Motor Drift 2 - some effort against gravity, limb cannot get to or maintain (if cued) 90 (or 45) degrees, drifts down to bed, but has some effort against gravity    5B: Test Right Arm Motor Drift 0 - no drift, limb holds 90 (or 45) degrees for full 10 seconds   6A: Test Left Leg Motor Drift 0 - no drift; leg holds 30 degree position for full 5 seconds   6B: Test Right Leg Motor Drift 0 - no drift; leg holds 30 degree position for full 5 seconds   7: Test Limb Ataxia   (FNF/Heel-Shin) 0 - absent   8: Test Sensation 1 - mild to moderate sensory loss; patient feels pinprick is less sharp or is dull on the affected side; there is a loss of superficial pain with pinprick but patient is aware of being touched    9: Test Language/Aphasia 1 - mild to moderate aphasia; some obvious loss of fluency or facility of comprehension without significant limitation on ideas expressed or form of expression. Reduction of speech and/or comprehension, however, makes conversation about provided materials difficult or impossible. For example, in conversation about provided materials, examiner can identify picture or naming card content from patient's response. 10: Test Dysarthria 1 - mild to moderate, patient slurs at least some words and at worst, can be understood with some difficulty   11: Test Extinction/Inattention 1 - visual, tactile, auditory, spatial or personal inattention or extinction to bilateral simultaneous stimulation in one of the sensory modalities    Total NIH Stroke Score: 7     tPA Criteria*  Inclusion criteria:  - Ischemic stroke onset within 3 hours of drug administration  - Age 25 or older  - No hemorrhage or non-stroke cause of deficit on CT  - Measurable deficit on NIH Stroke Scale    Exclusion criteria: If the patient. ...  - has minor or improving symptoms  - had seizure at onset of stroke  - has had another stroke or serious head trauma within the last 3 months  - has had major surgery within the last 14 days  - has known history of intracranial hemorrhage  - has sustained systolic blood pressure >525 mmHg  - has sustained diastolic blood pressure >110 mmHg  - requires aggressive treatment is necessary to lower their blood pressure  - has symptoms suggestive of subarachnoid hemorrhage  - has had GI or urinary tract hemorrhage within the last 21 days  - has had an arterial puncture at a non-compressible site within the last 7 days  - received heparin within the last 48 hours and has an elevated PTT  - has a prothrombin time (PT) >15 seconds  - has a platelet count <443,306 uL  - serum blood glucose is <50 mg/dL or >400 mg/dL    Relative Contraindications:  - NIH Stroke score >22  - Patient's CT shows evidence of large MCA territory infarction (>1/3 the MCA territory)    *[facility] Policy Paper      Acute CVA Core Measures:     - t-PA Eligibility: IV t-PA was Administered-Total dose IV tPA is 0.9 mg/kg (maximum 90 mg). Given 10% over one minute, then remaining 90% given as infusion over 60 minutes. See MAR  - The case has been discussed with Dr. Maliha Golden, they agree this patient is eligible for t-PA and agree with the plan. Past Medical History:  has no past medical history on file. Past Surgical History:  has no past surgical history on file. Social History:  reports that he has never smoked. He has never used smokeless tobacco. He reports previous alcohol use. He reports that he does not use drugs. Prior Functional Status(Modified Bill Scale):  0=No symptoms at all    Family History: family history is not on file. The patients home medications have been reviewed. Prior to Admission medications    Not on File       Allergies: Patient has no known allergies.        Review of Systems:   Pertinent positives and negatives are stated within HPI, all other systems reviewed and are negative.    ---------------------------------------------------PHYSICAL EXAM--------------------------------------    Constitutional/General: Alert and oriented x3,   Head: Normocephalic and atraumatic  Eyes: PERRL, EOMI  Mouth: Oropharynx clear, handling Potassium 4.6 3.5 - 5.0 mmol/L    Chloride 102 98 - 107 mmol/L    CO2 25 22 - 29 mmol/L    Anion Gap 12 7 - 16 mmol/L    Glucose 95 74 - 99 mg/dL    BUN 21 6 - 23 mg/dL    CREATININE 1.2 0.7 - 1.2 mg/dL    GFR Non-African American 58 >=60 mL/min/1.73    GFR African American >60     Calcium 9.4 8.6 - 10.2 mg/dL    Total Protein 7.2 6.4 - 8.3 g/dL    Albumin 4.0 3.5 - 5.2 g/dL    Total Bilirubin 0.4 0.0 - 1.2 mg/dL    Alkaline Phosphatase 96 40 - 129 U/L    ALT 14 0 - 40 U/L    AST 22 0 - 39 U/L   Protime-INR   Result Value Ref Range    Protime 11.5 9.3 - 12.4 sec    INR 1.1    APTT   Result Value Ref Range    aPTT 27.2 24.5 - 35.1 sec   Magnesium   Result Value Ref Range    Magnesium 2.1 1.6 - 2.6 mg/dL   Urine Drug Screen   Result Value Ref Range    Drug Screen Comment: see below    POCT Glucose   Result Value Ref Range    Meter Glucose 94 74 - 99 mg/dL       RADIOLOGY:  Interpreted by Radiologist.  CTA HEAD W CONTRAST   Final Result   1. Severe atherosclerotic disease . 2. Estimated stenosis of the proximal right and left internal carotid   artery by NASCET criteria is greater than 90% on the right         This study was analyzed by the [employer]. ai algorithm. CTA NECK W CONTRAST   Final Result   1. Severe atherosclerotic disease . 2. Estimated stenosis of the proximal right and left internal carotid   artery by NASCET criteria is greater than 90% on the right         This study was analyzed by the [employer]. ai algorithm. CT BRAIN PERFUSION   Final Result      Findings suspicious for small region of ischemia in the right temporal   lobe      This study was analyzed by the [employer]. ai algorithm. CT HEAD WO CONTRAST   Final Result   No acute intracranial abnormality. XR CHEST PORTABLE    (Results Pending)   IR INTERVENTIONAL RADIOLOGY PROCEDURE REQUEST    (Results Pending)       EKG: This EKG is signed and interpreted by me.     Rate: 74  Rhythm: Sinus  Interpretation: Sinus rhythm, right axis, right bundle branch block, no other acute findings no prior for comparison  Comparison: no previous EKG available            ------------------------- NURSING NOTES AND VITALS REVIEWED ---------------------------   The nursing notes within the ED encounter and vital signs as below have been reviewed. BP (!) 151/77   Pulse 74   Temp 98 °F (36.7 °C)   Resp 16   Wt 220 lb (99.8 kg)   SpO2 95%   Oxygen Saturation Interpretation: Normal    The patients available past medical records and past encounters were reviewed. ------------------------------ ED COURSE/MEDICAL DECISION MAKING----------------------  Medications   sodium chloride flush 0.9 % injection 10 mL (has no administration in time range)   sodium chloride flush 0.9 % injection 5-40 mL (has no administration in time range)   sodium chloride flush 0.9 % injection 5-40 mL (has no administration in time range)   0.9 % sodium chloride infusion (has no administration in time range)   dextrose 50 % IV solution (has no administration in time range)   sodium chloride flush 0.9 % injection 5-40 mL (has no administration in time range)   sodium chloride flush 0.9 % injection 5-40 mL (has no administration in time range)   0.9 % sodium chloride infusion (has no administration in time range)   alteplase (ACTIVASE) injection 9 mg (9 mg Intravenous New Bag 6/10/21 1254)     Followed by   alteplase (ACTIVASE) injection 80.8 mg (has no administration in time range)     Followed by   0.9 % sodium chloride bolus (has no administration in time range)   dextrose 50 % IV solution (has no administration in time range)   labetalol (NORMODYNE;TRANDATE) injection 5 mg (5 mg Intravenous Given 6/10/21 1248)   iopamidol (ISOVUE-370) 76 % injection 100 mL (100 mLs Intravenous Given 6/10/21 1202)       Based upon patient's stroke like symptoms a stroke neurology consult is indicated. Consult to Neurology completed.  [facility] ([facility]) Neurology via Telepresence      Acute CVA Core Measures:   Last Known to be Well (Stroke Diagnosis)  Date Last Known Well: 06/10/21  Time Last Known Well: 1030  NIH Stroke Scale Total: Total: 0  t-PA Eligibility: was recommeded by [facility] Neurologist and under the order of the ED physician  IV t-PA was Administered-Total dose IV tPA is 0.9 mg/kg (maximum 90 mg). Given 10% over one minute, then remaining 90% given as infusion over 60 minutes. Yes indication for TPA. Medical Decision Making:    Patient presents about an hour after sudden onset of left-sided arm weakness and left-sided numbness. NIH stroke scale of 7 upon arrival.  Stroke alert was called, neurology was consulted. After discussion informed consent TPA was initiated. Spoke with medicine as well as interventional radiology, patient receiving TPA, will likely need carotid stent in approximately 24 hours. Will be admitted    Re-Evaluations:             Re-evaluation. Patients symptoms show no change    This patient's ED course included: a personal history and physicial examination, multiple bedside re-evaluations, IV medications, cardiac monitoring, continuous pulse oximetry and complex medical decision making and emergency management    This patient has initially deteriorated, but then stabilized during their ED course. Consultations: The case has been discussed with Dr. Arnaldo Curran, they agree this patient is eligible for t-PA and agree with the plan. Spoke with Dr. Ely Garcia (IR). Discussed case. They will provide consultation and will evaluate for carotid stenting 24 hours. Dr. Jeneane Severance they will admit the patient    Consult placed to [id number]: Please note that the withdrawal or failure to initiate urgent interventions for this patient would likely result in a life threatening deterioration or permanent disability.       Accordingly this patient received 36 minutes of critical care time, excluding separately billable

## 2021-06-10 NOTE — ED NOTES
ER Clinical Pharmacy Note:    bill presents to the ER @ 11:44 for stroke symptoms. Arrival time = 11:44  POCT glucose = 98  NIH = 7  Weight = 99.8 kg  Total dose tPA = 0.9mg/kg x 99.8 kg = 89.8 mg  Bolus dose = 9 mg over 1 minute, started at 12:54  Infusion dose = 80.8 mg over 60 minutes  Door to needle time = 70 minutes    Patient and/or family was counseled on the risks/benefits of giving tPA. They agree and wish to proceed.     Samaria Vidal, PharmD, [id number] [facility] 6/10/2021 4:33 PM  Pharmacy Clinical Specialist, Emergency Medicine  [phone]

## 2021-06-10 NOTE — ED NOTES
Bed: 01  Expected date:   Expected time:   Means of arrival:   Comments:  Abigail Dutta RN  06/10/21 1142

## 2021-06-11 ENCOUNTER — ANESTHESIA (OUTPATIENT)
Dept: INTERVENTIONAL RADIOLOGY/VASCULAR | Age: 81
DRG: 062 | End: 2021-06-11
Payer: MEDICARE

## 2021-06-11 ENCOUNTER — ANESTHESIA EVENT (OUTPATIENT)
Dept: INTERVENTIONAL RADIOLOGY/VASCULAR | Age: 81
DRG: 062 | End: 2021-06-11
Payer: MEDICARE

## 2021-06-11 ENCOUNTER — APPOINTMENT (OUTPATIENT)
Dept: INTERVENTIONAL RADIOLOGY/VASCULAR | Age: 81
DRG: 062 | End: 2021-06-11
Payer: MEDICARE

## 2021-06-11 ENCOUNTER — APPOINTMENT (OUTPATIENT)
Dept: CT IMAGING | Age: 81
DRG: 062 | End: 2021-06-11
Payer: MEDICARE

## 2021-06-11 VITALS
DIASTOLIC BLOOD PRESSURE: 82 MMHG | OXYGEN SATURATION: 100 % | RESPIRATION RATE: 16 BRPM | SYSTOLIC BLOOD PRESSURE: 154 MMHG

## 2021-06-11 LAB
ALBUMIN SERPL-MCNC: 3.9 G/DL (ref 3.5–5.2)
ALP BLD-CCNC: 93 U/L (ref 40–129)
ALT SERPL-CCNC: 14 U/L (ref 0–40)
ANION GAP SERPL CALCULATED.3IONS-SCNC: 15 MMOL/L (ref 7–16)
AST SERPL-CCNC: 26 U/L (ref 0–39)
BILIRUB SERPL-MCNC: 0.7 MG/DL (ref 0–1.2)
BUN BLDV-MCNC: 16 MG/DL (ref 6–23)
CALCIUM SERPL-MCNC: 9.4 MG/DL (ref 8.6–10.2)
CHLORIDE BLD-SCNC: 103 MMOL/L (ref 98–107)
CHOLESTEROL, TOTAL: 181 MG/DL (ref 0–199)
CO2: 21 MMOL/L (ref 22–29)
CREAT SERPL-MCNC: 1.2 MG/DL (ref 0.7–1.2)
GFR AFRICAN AMERICAN: >60
GFR NON-AFRICAN AMERICAN: 58 ML/MIN/1.73
GLUCOSE BLD-MCNC: 91 MG/DL (ref 74–99)
HBA1C MFR BLD: 5.6 % (ref 4–5.6)
HCT VFR BLD CALC: 43.2 % (ref 37–54)
HDLC SERPL-MCNC: 51 MG/DL
HEMOGLOBIN: 14.3 G/DL (ref 12.5–16.5)
LDL CHOLESTEROL CALCULATED: 113 MG/DL (ref 0–99)
MAGNESIUM: 2.3 MG/DL (ref 1.6–2.6)
MCH RBC QN AUTO: 30.1 PG (ref 26–35)
MCHC RBC AUTO-ENTMCNC: 33.1 % (ref 32–34.5)
MCV RBC AUTO: 90.9 FL (ref 80–99.9)
PDW BLD-RTO: 12.9 FL (ref 11.5–15)
PHOSPHORUS: 3.2 MG/DL (ref 2.5–4.5)
PLATELET # BLD: 294 E9/L (ref 130–450)
PMV BLD AUTO: 9.4 FL (ref 7–12)
POTASSIUM SERPL-SCNC: 4.3 MMOL/L (ref 3.5–5)
RBC # BLD: 4.75 E12/L (ref 3.8–5.8)
SODIUM BLD-SCNC: 139 MMOL/L (ref 132–146)
TOTAL PROTEIN: 7.3 G/DL (ref 6.4–8.3)
TRIGL SERPL-MCNC: 87 MG/DL (ref 0–149)
TSH SERPL DL<=0.05 MIU/L-ACNC: 2.72 UIU/ML (ref 0.27–4.2)
VLDLC SERPL CALC-MCNC: 17 MG/DL
WBC # BLD: 8.1 E9/L (ref 4.5–11.5)

## 2021-06-11 PROCEDURE — 84443 ASSAY THYROID STIM HORMONE: CPT

## 2021-06-11 PROCEDURE — 37215 TRANSCATH STENT CCA W/EPS: CPT

## 2021-06-11 PROCEDURE — 6370000000 HC RX 637 (ALT 250 FOR IP): Performed by: INTERNAL MEDICINE

## 2021-06-11 PROCEDURE — 83735 ASSAY OF MAGNESIUM: CPT

## 2021-06-11 PROCEDURE — 80061 LIPID PANEL: CPT

## 2021-06-11 PROCEDURE — 6360000002 HC RX W HCPCS: Performed by: NURSE PRACTITIONER

## 2021-06-11 PROCEDURE — 70450 CT HEAD/BRAIN W/O DYE: CPT | Performed by: RADIOLOGY

## 2021-06-11 PROCEDURE — G0269 OCCLUSIVE DEVICE IN VEIN ART: HCPCS

## 2021-06-11 PROCEDURE — C9248 INJ, CLEVIDIPINE BUTYRATE: HCPCS

## 2021-06-11 PROCEDURE — 2580000003 HC RX 258: Performed by: NURSE PRACTITIONER

## 2021-06-11 PROCEDURE — 37215 TRANSCATH STENT CCA W/EPS: CPT | Performed by: RADIOLOGY

## 2021-06-11 PROCEDURE — C9113 INJ PANTOPRAZOLE SODIUM, VIA: HCPCS | Performed by: INTERNAL MEDICINE

## 2021-06-11 PROCEDURE — 2500000003 HC RX 250 WO HCPCS

## 2021-06-11 PROCEDURE — 99222 1ST HOSP IP/OBS MODERATE 55: CPT | Performed by: RADIOLOGY

## 2021-06-11 PROCEDURE — 6360000002 HC RX W HCPCS: Performed by: INTERNAL MEDICINE

## 2021-06-11 PROCEDURE — 6360000002 HC RX W HCPCS: Performed by: RADIOLOGY

## 2021-06-11 PROCEDURE — 2709999900 IR CAROTID STENT W PROTECTION

## 2021-06-11 PROCEDURE — 97162 PT EVAL MOD COMPLEX 30 MIN: CPT

## 2021-06-11 PROCEDURE — 70450 CT HEAD/BRAIN W/O DYE: CPT

## 2021-06-11 PROCEDURE — 2580000003 HC RX 258: Performed by: INTERNAL MEDICINE

## 2021-06-11 PROCEDURE — 3700000001 HC ADD 15 MINUTES (ANESTHESIA)

## 2021-06-11 PROCEDURE — 37799 UNLISTED PX VASCULAR SURGERY: CPT

## 2021-06-11 PROCEDURE — 2000000000 HC ICU R&B

## 2021-06-11 PROCEDURE — 85027 COMPLETE CBC AUTOMATED: CPT

## 2021-06-11 PROCEDURE — 2500000003 HC RX 250 WO HCPCS: Performed by: NURSE PRACTITIONER

## 2021-06-11 PROCEDURE — 97166 OT EVAL MOD COMPLEX 45 MIN: CPT

## 2021-06-11 PROCEDURE — 6360000002 HC RX W HCPCS

## 2021-06-11 PROCEDURE — 84100 ASSAY OF PHOSPHORUS: CPT

## 2021-06-11 PROCEDURE — 83036 HEMOGLOBIN GLYCOSYLATED A1C: CPT

## 2021-06-11 PROCEDURE — 3E03317 INTRODUCTION OF OTHER THROMBOLYTIC INTO PERIPHERAL VEIN, PERCUTANEOUS APPROACH: ICD-10-PCS | Performed by: RADIOLOGY

## 2021-06-11 PROCEDURE — APPSS15 APP SPLIT SHARED TIME 0-15 MINUTES: Performed by: NURSE PRACTITIONER

## 2021-06-11 PROCEDURE — B41F1ZZ FLUOROSCOPY OF RIGHT LOWER EXTREMITY ARTERIES USING LOW OSMOLAR CONTRAST: ICD-10-PCS | Performed by: RADIOLOGY

## 2021-06-11 PROCEDURE — 6370000000 HC RX 637 (ALT 250 FOR IP): Performed by: RADIOLOGY

## 2021-06-11 PROCEDURE — 6360000004 HC RX CONTRAST MEDICATION: Performed by: RADIOLOGY

## 2021-06-11 PROCEDURE — 80053 COMPREHEN METABOLIC PANEL: CPT

## 2021-06-11 PROCEDURE — 97530 THERAPEUTIC ACTIVITIES: CPT

## 2021-06-11 PROCEDURE — 36415 COLL VENOUS BLD VENIPUNCTURE: CPT

## 2021-06-11 PROCEDURE — 3700000000 HC ANESTHESIA ATTENDED CARE

## 2021-06-11 PROCEDURE — 99221 1ST HOSP IP/OBS SF/LOW 40: CPT | Performed by: PSYCHIATRY & NEUROLOGY

## 2021-06-11 RX ORDER — DEXAMETHASONE SODIUM PHOSPHATE 10 MG/ML
INJECTION, EMULSION INTRAMUSCULAR; INTRAVENOUS PRN
Status: DISCONTINUED | OUTPATIENT
Start: 2021-06-11 | End: 2021-06-11 | Stop reason: SDUPTHER

## 2021-06-11 RX ORDER — ASPIRIN 81 MG/1
81 TABLET ORAL DAILY
Status: DISCONTINUED | OUTPATIENT
Start: 2021-06-12 | End: 2021-06-15 | Stop reason: HOSPADM

## 2021-06-11 RX ORDER — ONDANSETRON 2 MG/ML
INJECTION INTRAMUSCULAR; INTRAVENOUS PRN
Status: DISCONTINUED | OUTPATIENT
Start: 2021-06-11 | End: 2021-06-11 | Stop reason: SDUPTHER

## 2021-06-11 RX ORDER — FENTANYL CITRATE 50 UG/ML
INJECTION, SOLUTION INTRAMUSCULAR; INTRAVENOUS PRN
Status: DISCONTINUED | OUTPATIENT
Start: 2021-06-11 | End: 2021-06-11 | Stop reason: SDUPTHER

## 2021-06-11 RX ORDER — VECURONIUM BROMIDE 1 MG/ML
INJECTION, POWDER, LYOPHILIZED, FOR SOLUTION INTRAVENOUS PRN
Status: DISCONTINUED | OUTPATIENT
Start: 2021-06-11 | End: 2021-06-11 | Stop reason: SDUPTHER

## 2021-06-11 RX ORDER — ETOMIDATE 2 MG/ML
INJECTION, SOLUTION INTRAVENOUS PRN
Status: DISCONTINUED | OUTPATIENT
Start: 2021-06-11 | End: 2021-06-11 | Stop reason: SDUPTHER

## 2021-06-11 RX ORDER — LIDOCAINE HYDROCHLORIDE 20 MG/ML
INJECTION, SOLUTION INFILTRATION; PERINEURAL PRN
Status: DISCONTINUED | OUTPATIENT
Start: 2021-06-11 | End: 2021-06-11 | Stop reason: SDUPTHER

## 2021-06-11 RX ADMIN — ATORVASTATIN CALCIUM 80 MG: 40 TABLET, FILM COATED ORAL at 21:02

## 2021-06-11 RX ADMIN — FENTANYL CITRATE 50 MCG: 50 INJECTION, SOLUTION INTRAMUSCULAR; INTRAVENOUS at 13:21

## 2021-06-11 RX ADMIN — ONDANSETRON HYDROCHLORIDE 4 MG: 2 INJECTION, SOLUTION INTRAMUSCULAR; INTRAVENOUS at 13:06

## 2021-06-11 RX ADMIN — FENTANYL CITRATE 50 MCG: 50 INJECTION, SOLUTION INTRAMUSCULAR; INTRAVENOUS at 12:21

## 2021-06-11 RX ADMIN — FENTANYL CITRATE 50 MCG: 50 INJECTION, SOLUTION INTRAMUSCULAR; INTRAVENOUS at 13:59

## 2021-06-11 RX ADMIN — HYDRALAZINE HYDROCHLORIDE 10 MG: 20 INJECTION INTRAMUSCULAR; INTRAVENOUS at 18:06

## 2021-06-11 RX ADMIN — ETOMIDATE 5 MG: 2 INJECTION, SOLUTION INTRAVENOUS at 12:42

## 2021-06-11 RX ADMIN — SODIUM CHLORIDE, PRESERVATIVE FREE 10 ML: 5 INJECTION INTRAVENOUS at 09:41

## 2021-06-11 RX ADMIN — FENTANYL CITRATE 50 MCG: 50 INJECTION, SOLUTION INTRAMUSCULAR; INTRAVENOUS at 12:22

## 2021-06-11 RX ADMIN — DOCUSATE SODIUM 50 MG AND SENNOSIDES 8.6 MG 1 TABLET: 8.6; 5 TABLET, FILM COATED ORAL at 21:02

## 2021-06-11 RX ADMIN — TICAGRELOR 180 MG: 90 TABLET ORAL at 09:40

## 2021-06-11 RX ADMIN — VECURONIUM BROMIDE FOR INJECTION 5 MG: 1 INJECTION, POWDER, LYOPHILIZED, FOR SOLUTION INTRAVENOUS at 12:40

## 2021-06-11 RX ADMIN — IOVERSOL 60 ML: 678 INJECTION INTRA-ARTERIAL; INTRAVENOUS at 14:29

## 2021-06-11 RX ADMIN — ASPIRIN 568.5 MG: 81 TABLET, COATED ORAL at 09:39

## 2021-06-11 RX ADMIN — CLEVIPIDINE 1 MG/HR: 0.5 EMULSION INTRAVENOUS at 13:49

## 2021-06-11 RX ADMIN — VECURONIUM BROMIDE FOR INJECTION 5 MG: 1 INJECTION, POWDER, LYOPHILIZED, FOR SOLUTION INTRAVENOUS at 13:41

## 2021-06-11 RX ADMIN — DOCUSATE SODIUM 50 MG AND SENNOSIDES 8.6 MG 1 TABLET: 8.6; 5 TABLET, FILM COATED ORAL at 09:40

## 2021-06-11 RX ADMIN — TIROFIBAN 0.07 MCG/KG/MIN: 5 INJECTION, SOLUTION INTRAVENOUS at 13:29

## 2021-06-11 RX ADMIN — DEXAMETHASONE SODIUM PHOSPHATE 10 MG: 10 INJECTION, EMULSION INTRAMUSCULAR; INTRAVENOUS at 13:06

## 2021-06-11 RX ADMIN — LABETALOL HYDROCHLORIDE 10 MG: 5 INJECTION INTRAVENOUS at 17:32

## 2021-06-11 RX ADMIN — SODIUM CHLORIDE, PRESERVATIVE FREE 10 ML: 5 INJECTION INTRAVENOUS at 09:40

## 2021-06-11 RX ADMIN — LIDOCAINE HYDROCHLORIDE 100 MG: 20 INJECTION, SOLUTION INFILTRATION; PERINEURAL at 12:40

## 2021-06-11 RX ADMIN — SUGAMMADEX 396 MG: 100 INJECTION, SOLUTION INTRAVENOUS at 13:48

## 2021-06-11 RX ADMIN — PANTOPRAZOLE SODIUM 40 MG: 40 INJECTION, POWDER, FOR SOLUTION INTRAVENOUS at 09:40

## 2021-06-11 RX ADMIN — ETOMIDATE 15 MG: 2 INJECTION, SOLUTION INTRAVENOUS at 12:40

## 2021-06-11 RX ADMIN — VECURONIUM BROMIDE FOR INJECTION 5 MG: 1 INJECTION, POWDER, LYOPHILIZED, FOR SOLUTION INTRAVENOUS at 12:42

## 2021-06-11 ASSESSMENT — PULMONARY FUNCTION TESTS
PIF_VALUE: 20
PIF_VALUE: 21
PIF_VALUE: 1
PIF_VALUE: 1
PIF_VALUE: 0
PIF_VALUE: 21
PIF_VALUE: 24
PIF_VALUE: 0
PIF_VALUE: 0
PIF_VALUE: 20
PIF_VALUE: 21
PIF_VALUE: 30
PIF_VALUE: 0
PIF_VALUE: 0
PIF_VALUE: 21
PIF_VALUE: 20
PIF_VALUE: 13
PIF_VALUE: 1
PIF_VALUE: 25
PIF_VALUE: 36
PIF_VALUE: 20
PIF_VALUE: 20
PIF_VALUE: 0
PIF_VALUE: 2
PIF_VALUE: 20
PIF_VALUE: 23
PIF_VALUE: 18
PIF_VALUE: 4
PIF_VALUE: 23
PIF_VALUE: 0
PIF_VALUE: 0
PIF_VALUE: 1
PIF_VALUE: 21
PIF_VALUE: 1
PIF_VALUE: 18
PIF_VALUE: 1
PIF_VALUE: 2
PIF_VALUE: 0
PIF_VALUE: 20
PIF_VALUE: 5
PIF_VALUE: 16
PIF_VALUE: 0
PIF_VALUE: 0
PIF_VALUE: 1
PIF_VALUE: 21
PIF_VALUE: 19
PIF_VALUE: 20
PIF_VALUE: 25
PIF_VALUE: 26
PIF_VALUE: 25
PIF_VALUE: 21
PIF_VALUE: 0
PIF_VALUE: 18
PIF_VALUE: 13
PIF_VALUE: 20
PIF_VALUE: 23
PIF_VALUE: 24
PIF_VALUE: 1
PIF_VALUE: 39
PIF_VALUE: 20
PIF_VALUE: 21
PIF_VALUE: 17
PIF_VALUE: 1
PIF_VALUE: 21
PIF_VALUE: 21
PIF_VALUE: 20
PIF_VALUE: 1
PIF_VALUE: 0
PIF_VALUE: 1
PIF_VALUE: 0
PIF_VALUE: 21
PIF_VALUE: 2
PIF_VALUE: 0
PIF_VALUE: 1
PIF_VALUE: 0
PIF_VALUE: 0
PIF_VALUE: 21
PIF_VALUE: 20
PIF_VALUE: 25
PIF_VALUE: 26
PIF_VALUE: 20
PIF_VALUE: 21
PIF_VALUE: 21
PIF_VALUE: 20
PIF_VALUE: 23
PIF_VALUE: 22
PIF_VALUE: 1
PIF_VALUE: 21
PIF_VALUE: 1
PIF_VALUE: 0
PIF_VALUE: 0
PIF_VALUE: 1
PIF_VALUE: 23
PIF_VALUE: 20
PIF_VALUE: 0
PIF_VALUE: 31
PIF_VALUE: 1
PIF_VALUE: 18
PIF_VALUE: 22
PIF_VALUE: 1
PIF_VALUE: 23
PIF_VALUE: 1
PIF_VALUE: 0
PIF_VALUE: 22
PIF_VALUE: 19
PIF_VALUE: 0
PIF_VALUE: 22
PIF_VALUE: 22
PIF_VALUE: 1
PIF_VALUE: 1
PIF_VALUE: 21
PIF_VALUE: 2
PIF_VALUE: 0
PIF_VALUE: 22
PIF_VALUE: 1
PIF_VALUE: 18
PIF_VALUE: 5
PIF_VALUE: 21
PIF_VALUE: 0
PIF_VALUE: 20
PIF_VALUE: 1
PIF_VALUE: 23

## 2021-06-11 ASSESSMENT — PAIN SCALES - GENERAL
PAINLEVEL_OUTOF10: 0

## 2021-06-11 NOTE — PROGRESS NOTES
PM&R consulted @ 11:50 am. no loss of consciousness, no gait abnormality, no headache, no sensory deficits, and no weakness.

## 2021-06-11 NOTE — ANESTHESIA PRE PROCEDURE
Department of Anesthesiology  Preprocedure Note       Name:  Juan Velazquez   Age:  80 y.o.  :  1940                                          MRN:  [id number]         Date:  2021      Surgeon: * No surgeons listed *    Procedure: * No procedures listed *    Medications prior to admission:   Prior to Admission medications    Medication Sig Start Date End Date Taking?  Authorizing Provider   vitamin D (ERGOCALCIFEROL) 1.25 MG (17894 UT) CAPS capsule Take 50,000 Units by mouth once a week   Yes Historical Provider, MD   hydroCHLOROthiazide (HYDRODIURIL) 25 MG tablet Take 25 mg by mouth daily   Yes Historical Provider, MD       Current medications:    Current Facility-Administered Medications   Medication Dose Route Frequency Provider Last Rate Last Admin    0.9 % sodium chloride infusion  25 mL Intravenous PRN Antony Angst, APRN - CNS        sodium chloride flush 0.9 % injection 5-40 mL  5-40 mL Intravenous 2 times per day Arpan Taylorst, APRN - CNS   10 mL at 21 0941    sodium chloride flush 0.9 % injection 5-40 mL  5-40 mL Intravenous PRN Arpan Taylorst, APRN - CNS        ondansetron (ZOFRAN-ODT) disintegrating tablet 4 mg  4 mg Oral Q8H  N [place], DO        Or    [facility] injection 4 mg  4 mg Intravenous Q6H PRN Lori Cr DO        sennosides-docusate sodium (SENOKOT-S) 8.6-50 MG tablet 1 tablet  1 tablet Oral  N [place], DO   1 tablet at 21 0940    labetalol (NORMODYNE;TRANDATE) injection 10 mg  10 mg Intravenous Q30 Min PRN Lori Cr DO        hydrALAZINE (APRESOLINE) injection 10 mg  10 mg Intravenous Q30 Min PRN Lori Cr DO        atorvastatin (LIPITOR) tablet 80 mg  80 mg Oral Nightly Lori Cr DO   80 mg at 06/10/21 2029    perflutren lipid microspheres (DEFINITY) injection 1.65 mg  1.5 mL Intravenous ONCE PRN Lori Cr DO        polyethylene glycol (GLYCOLAX) packet 17 g  17 g Oral Daily [address], DO        pantoprazole (PROTONIX) injection 40 mg  40 mg Intravenous Daily [place] OSC[place] Cr DO   40 mg at 06/11/21 0940    And    sodium chloride (PF) 0.9 % injection 10 mL  10 mL Intravenous Daily [place] OSC[place] Cr DO   10 mL at 06/11/21 0940       Allergies:  No Known Allergies    Problem List:    Patient Active Problem List   Diagnosis Code    [facility] I63.9       Past Medical History:        Diagnosis Date    Essential hypertension     Stroke [facility])        Past Surgical History:  History reviewed. No pertinent surgical history. Social History:    Social History     Tobacco Use    Smoking status: Never Smoker    Smokeless tobacco: Never Used   Substance Use Topics    Alcohol use: Not Currently                                Counseling given: Not Answered      Vital Signs (Current):   Vitals:    06/11/21 0954 06/11/21 1000 06/11/21 1054 06/11/21 1100   BP: (!) 154/77 (!) 154/77 (!) 160/83 (!) 160/83   Pulse: 81 79 84 72   Resp: 17 19 21 15   Temp:  36.7 °C (98 °F)     TempSrc:  Oral     SpO2: 94% 95% 94%    Weight:       Height:                                                  BP Readings from Last 3 Encounters:   06/11/21 (!) 160/83       NPO Status:                                                                                 BMI:   Wt Readings from Last 3 Encounters:   06/10/21 218 lb (98.9 kg)     Body mass index is 35.19 kg/m².     CBC:   Lab Results   Component Value Date    WBC 8.1 06/11/2021    RBC 4.75 06/11/2021    HGB 14.3 06/11/2021    HCT 43.2 06/11/2021    MCV 90.9 06/11/2021    RDW 12.9 06/11/2021     06/11/2021       CMP:   Lab Results   Component Value Date     06/11/2021    K 4.3 06/11/2021     06/11/2021    CO2 21 06/11/2021    BUN 16 06/11/2021    CREATININE 1.2 06/11/2021    GFRAA >60 06/11/2021    LABGLOM 58 06/11/2021    GLUCOSE 91 06/11/2021    PROT 7.3 06/11/2021    CALCIUM 9.4 06/11/2021    BILITOT 0.7 06/11/2021    ALKPHOS 93 06/11/2021    AST 26 06/11/2021    ALT 14 06/11/2021       POC Tests: No results for input(s): POCGLU, POCNA, POCK, POCCL, POCBUN, POCHEMO, POCHCT in the last 72 hours. Coags:   Lab Results   Component Value Date    PROTIME 11.5 06/10/2021    INR 1.1 06/10/2021    APTT 27.2 06/10/2021       HCG (If Applicable): No results found for: PREGTESTUR, PREGSERUM, HCG, HCGQUANT     ABGs: No results found for: PHART, PO2ART, XYG0UEL, XSI2NXS, BEART, M8FYBEBD     Type & Screen (If Applicable):  No results found for: LABABO, LABRH    Drug/Infectious Status (If Applicable):  No results found for: HIV, HEPCAB    COVID-19 Screening (If Applicable):   Lab Results   Component Value Date    COVID19 Not Detected 06/10/2021           Anesthesia Evaluation  Patient summary reviewed and Nursing notes reviewed no history of anesthetic complications:   Airway: Mallampati: II  TM distance: >3 FB   Neck ROM: full  Mouth opening: > = 3 FB Dental:          Pulmonary:Negative Pulmonary ROS breath sounds clear to auscultation                             Cardiovascular:  Exercise tolerance: good (>4 METS),   (+) hypertension:,         Rhythm: regular  Rate: normal           Beta Blocker:  Dose within 24 Hrs         Neuro/Psych:   (+) CVA ( left sided weakness): residual symptoms,             GI/Hepatic/Renal:   (+) morbid obesity          Endo/Other:              Pt had no PAT visit       Abdominal:           Vascular:   + PVD, aortic or cerebral, . Anesthesia Plan      general     ASA 4       Induction: intravenous. arterial line  MIPS: Postoperative opioids intended and Prophylactic antiemetics administered. Anesthetic plan and risks discussed with patient. Use of blood products discussed with patient whom consented to blood products. Plan discussed with CRNA and attending.     Attending anesthesiologist reviewed and agrees with Pre Eval content            Danita Fabry, RN 6/11/2021      DOS STAFF ADDENDUM:    Pt seen and examined, physical exam updated, chart reviewed including anesthesia, drug and allergy history. H&P reviewed. No interval changes to history or physical examination (unless noted above). NPO status confirmed. Anesthetic plan, risks, benefits, alternatives discussed with patient. Patient verbalized an understanding and agrees to proceed.      Steven Fierro MD   Anesthesiologist

## 2021-06-11 NOTE — BRIEF OP NOTE
Brief Postoperative Note    Madison Lowe  YOB: 1940  [id number]    Pre-operative Diagnosis and Procedure: angio and possible CS right    Post-operative Diagnosis: Same    Anesthesia: Local    Estimated Blood Loss: < 10 cc    Surgeon: Ofelia COBB     Complications: none    Specimen obtained: none   Findings: none     Kiko Barraza II, MD   6/11/2021 12:46 PM

## 2021-06-11 NOTE — ANESTHESIA POSTPROCEDURE EVALUATION
Department of Anesthesiology  Postprocedure Note    Patient: Chantel Sheth  MRN: [id number]  YOB: 1940  Date of evaluation: 6/11/2021  Time:  2:47 PM     Procedure Summary     Date: 06/11/21 Room / Location: [address] [place] [address]    Anesthesia Start: 1159 Anesthesia Stop: 6281    Procedure: IR CAROTID STENT UNI W PROTECTION Diagnosis: (carotid stent)    Scheduled Providers: Martin Radiologist Responsible Provider: Carlos Whitt MD    Anesthesia Type: general ASA Status: 4          Anesthesia Type: general    Brooke Phase I:      Brooke Phase II:      Last vitals: Reviewed and per EMR flowsheets.        Anesthesia Post Evaluation    Patient location during evaluation: PACU  Patient participation: complete - patient participated  Level of consciousness: awake and alert  Pain score: 0  Airway patency: patent  Nausea & Vomiting: no vomiting and no nausea  Complications: no  Cardiovascular status: hemodynamically stable  Respiratory status: spontaneous ventilation  Hydration status: stable

## 2021-06-11 NOTE — PROGRESS NOTES
Zenaida sent to Dr. Princess Mcfarlane regarding patient passing bedside swallow, and any orders for advancing diet.

## 2021-06-11 NOTE — CARE COORDINATION
SOCIAL WORK/CASEMANAGEMENT TRANSITION OF CARE [id number] Jane Anthony, [address], [facility] , [phone]): I met with pt in the room and his son came in later. Pt lives with wife and both are retired. They have 3 children, one is  since 14, there is a son and daughter local that work. Pt is not a . He is independent pta with all adl's and drives. No c pta. Pt has a used w/c, fww, cane that he doesn't use , cpap he uses and a shower chair. He has a 2  Story home with 1 step from garage to enter, then 6 steps to the bed and bath with rails. Pt complaining of new weakness on left side. [facility] is active with [facility] in [place] where he resides for outpatient therapy. He wants to go back there as well. I left a note for pcp and rn for a script for PT and OT to eval and treat. Pt said he has back surgery on 2021. Andrey/tiffanie to follow.  DERRICK Barbosa  2021

## 2021-06-11 NOTE — ANESTHESIA PROCEDURE NOTES
Arterial Line:    An arterial line was placed using surface landmarks, in the OR for the following indication(s): continuous blood pressure monitoring. A 20 gauge (size), 1 and 3/4 inch (length), Arrow (type) catheter was placed, Seldinger technique not used, into the left radial artery, secured by tape and Tegaderm. Anesthesia type: Local  Local infiltration: Injection    Events:  patient tolerated procedure well with no complications and EBL < 5mL.   6/11/2021 12:35 PM6/11/2021 12:40 PM  Anesthesiologist: Carlos Whitt MD  Resident/CRNA: ALEJANDRO Leahy CRNA  Performed: Resident/CRNA   Preanesthetic Checklist  Completed: patient identified, IV checked, site marked, risks and benefits discussed, surgical consent, monitors and equipment checked, pre-op evaluation, timeout performed, anesthesia consent given, oxygen available and patient being monitored

## 2021-06-11 NOTE — CONSULTS
[facility]  Neurology Consult    Date:  6/11/2021  Patient Name:  Stefan Sarabia  YOB: 1940  MRN: [id number]     PCP:  No primary care provider on file. Referring:  No ref. provider found      Chief Complaint: left extremity weakness    History obtained from: patient, chart review    Martha Recinos is a 80 y.o. 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 male presenting for evaluation of left extremity weakness. The patient's symptoms started at approximately 1030 while at work and came on suddenly with associated paresthesias and difficulty speaking. He was promptly brought to [facility] ED. Evaluation in the ED revealed an NIH of 7; CT head showed nothing acute but CTA imaging was with severe R ICA stenosis of at least 90%. CT perfusion showed questionable ischemia in the right temporal lobe. With consultation with Telestroke in the ED, tpa was ultimately given as patient was within window. Stroke was thought to likely be coming from high grade R ICA stenosis. IR was consulted from the ED for R ICA stent placement. CT head 24 hours post tpa showed no bleed; acute R posterior frontal lobe infarct. Now awaiting stent placement with IR. Presently, the patient still reports weakness in his left arm more so than his left leg. He has right sided gaze preference. Memory, attention, and concentration appropriate. Is with left arm dirft > left leg drift.            Review of Systems:  Constitutional  · Fever: no    Eyes  · Visions loss: no    Ears, Nose, Mouth, and Throat  · Difficulty swallowing: no    Cardiovascular  · Chest Pain: no    Respiratory  · Shortness of Breath: no    Gastrointestinal  · Abdominal Pain: no    Genitourinary  · Difficulty with Urination: no      Musculoskeletal  · Back Pain: no    Neurological  · Headaches: no  · Weakness: yes -   · Numbness: yes -         Medical History:   Past Medical History:   Diagnosis Date    Essential hypertension     Stroke [facility])         Surgical History:   History reviewed. No pertinent surgical history. Family History:   History reviewed. No pertinent family history.     Social History:  Social History     Tobacco Use    Smoking status: Never Smoker    Smokeless tobacco: Never Used   Vaping Use    Vaping Use: Never used   Substance Use Topics    Alcohol use: Not Currently    Drug use: Never        Current Medications:      Current Facility-Administered Medications   Medication Dose Route Frequency Provider Last Rate Last Admin    0.9 % sodium chloride infusion  25 mL Intravenous PRN ALEJANDRO Antunez - CNS        sodium chloride flush 0.9 % injection 5-40 mL  5-40 mL Intravenous 2 times per day ALEJANDRO Antunez - CNS   10 mL at 06/11/21 0941    sodium chloride flush 0.9 % injection 5-40 mL  5-40 mL Intravenous PRN ALEJANDRO Antunez - MIKE        ondansetron (ZOFRAN-ODT) disintegrating tablet 4 mg  4 mg Oral Q8H PRN Heather Fuller DO        Or    ondansetron [facility]) injection 4 mg  4 mg Intravenous Q6H PRN Lori Cr DO        sennosides-docusate sodium (SENOKOT-S) 8.6-50 MG tablet 1 tablet  1 tablet Oral BID Heather Fuller DO   1 tablet at 06/11/21 0940    labetalol (NORMODYNE;TRANDATE) injection 10 mg  10 mg Intravenous Q30 Min PRN Lori Cr DO        hydrALAZINE (APRESOLINE) injection 10 mg  10 mg Intravenous Q30 Min PRN Lori Cr DO        atorvastatin (LIPITOR) tablet 80 mg  80 mg Oral Nightly Lori Cr DO   80 mg at 06/10/21 2029    perflutren lipid microspheres (DEFINITY) injection 1.65 mg  1.5 mL Intravenous ONCE PRN Efrem Pulling, DO        polyethylene glycol [facility]) packet 17 g  17 g Oral Daily Livier Evan MOORE Les, DO        pantoprazole (PROTONIX) injection 40 mg  40 mg Intravenous Daily Livier Evan Cr, DO   40 mg at 06/11/21 0940    And    sodium chloride (PF) 0.9 % injection 10 mL  10 mL Intravenous Daily Jordon Cr, DO   10 mL at 06/11/21 0940        Allergies:      No Known Allergies     Physical Examination  Vitals   Vitals:    06/11/21 0854 06/11/21 0900 06/11/21 0954 06/11/21 1000   BP: (!) 171/102 (!) 171/102 (!) 154/77 (!) 154/77   Pulse: 82 80 81 79   Resp: 20 19 17 19   Temp:    98 °F (36.7 °C)   TempSrc:    Oral   SpO2: 94% 95% 94% 95%   Weight:       Height:            General: No acute distress; right sided gaze preference  HEENT: Normocephalic, atraumatic  Chest: Clear to auscultation bilaterally  Heart: Regular rate and rhythm, no murmurs appreciated  Extremities: No edema or cyanosis noted    Neurologic Examination    Mental Status  Alert, and oriented to person, place and time with normal speech and language. No evidence of aphasia during conversation. No evidence of memory impairment. Attention and concentration appeared normal.     Cranial Nerves  II. Peripheral visual fields diminished on right side  III, IV, VI: Pupils equally round and reactive to light, 3 to 2 mm bilaterally. EOMs: full, no nystagmus.    V. Facial sensation intact to light touch bilaterally;  VII: Facial movements symmetric and strong  VIII: Hearing intact to voice  IX,X: Palate elevates symmetrically  XI: Sternocleidomastoid and trapezius 5/5 bilaterally   XII: Tongue is midline    Motor     Right Left   Right Left   Deltoid 5 2  Hip Flexion 5 3   Biceps      5  2  Knee Extension 5 3   Triceps 5 2  Knee Flexion 5 3   Handgrip 5 2  Ankle Dorsiflexion 5 3       Ankle Plantarflexion 5 3     Tone: Normal in all four limbs  Bulk: Normal in all four limbs with no evidence of atrophy    Sensation  · Light Touch: Intact distally; diminished on the left upper and lower extremities    Reflexes     Right Left   Biceps 2 2   Brachioradialis 2 2   Triceps 2 2   Patellar 2 2   Achilles 2 2   ankle clonus none none     Toes down going bilaterally.     Coordination  Finger to nose testing normal bilaterally    Labs  Recent Results (from the past 24 hour(s))   POCT Glucose     Collection Time: 06/10/21 11:52 AM   Result Value Ref Range     Meter Glucose 94 74 - 99 mg/dL   Protime-INR     Collection Time: 06/10/21 12:00 PM   Result Value Ref Range     Protime 11.5 9.3 - 12.4 sec     INR 1.1     APTT     Collection Time: 06/10/21 12:00 PM   Result Value Ref Range     aPTT 27.2 24.5 - 35.1 sec   Urine Drug Screen     Collection Time: 06/10/21 12:14 PM   Result Value Ref Range     Drug Screen Comment: see below     CBC Auto Differential     Collection Time: 06/10/21 12:30 PM   Result Value Ref Range     WBC 8.7 4.5 - 11.5 E9/L     RBC 4.81 3.80 - 5.80 E12/L     Hemoglobin 14.3 12.5 - 16.5 g/dL     Hematocrit 44.4 37.0 - 54.0 %     MCV 92.3 80.0 - 99.9 fL     MCH 29.7 26.0 - 35.0 pg     MCHC 32.2 32.0 - 34.5 %     RDW 12.8 11.5 - 15.0 fL     Platelets 378 323 - 651 E9/L     MPV 9.7 7.0 - 12.0 fL     Neutrophils % 72.8 43.0 - 80.0 %     Immature Granulocytes % 0.7 0.0 - 5.0 %     Lymphocytes % 14.2 (L) 20.0 - 42.0 %     Monocytes % 10.9 2.0 - 12.0 %     Eosinophils % 0.7 0.0 - 6.0 %     Basophils % 0.7 0.0 - 2.0 %     Neutrophils Absolute 6.32 1.80 - 7.30 E9/L     Immature Granulocytes # 0.06 E9/L     Lymphocytes Absolute 1.23 (L) 1.50 - 4.00 E9/L     Monocytes Absolute 0.95 0.10 - 0.95 E9/L     Eosinophils Absolute 0.06 0.05 - 0.50 E9/L     Basophils Absolute 0.06 0.00 - 0.20 E9/L         Imaging  CT HEAD WO CONTRAST     Result Date: 6/10/2021  EXAMINATION: CT OF THE HEAD WITHOUT CONTRAST  6/10/2021 12:01 pm TECHNIQUE: CT of the head was performed without the administration of intravenous contrast using CT angiographic protocol. Sagittal and coronal images were reconstructed from the axial plane acquisition. Additional MIP reconstructions were presented to aid in the interpretation of this study. Images were obtained from the skull base cranially. There is mild calcified plaque identified in the vessels compatible with atherosclerotic disease. There is aortic arch and great vessels demonstrate mild atherosclerotic disease. The right carotid is abnormal. There is  evidence for hemodynamically significant stenosis at the level the proximal internal carotid artery. By NASCET criteria estimated stenosis is 90% or greater The left carotid is unremarkable. The right vertebral artery is unremarkable. The left vertebral artery is unremarkable. The basilar artery is unremarkable. The middle cerebral arteries are unremarkable. The anterior cerebral arteries are unremarkable. The posterior cerebral arteries are unremarkable.      1. Severe atherosclerotic disease . 2. Estimated stenosis of the proximal right and left internal carotid artery by NASCET criteria is greater than 90% on the right This study was analyzed by the [employer] ai algorithm.      CT BRAIN PERFUSION     Result Date: 6/10/2021  Patient MRN: [id number] : 1940 Age:  80 years Gender: Male Order Date: 6/10/2021 12:01 PM Exam: CT BRAIN PERFUSION Number of Images: 333 views Indication:   cva cva Decision Support Exception - unselect if not a suspected or confirmed emergency medical condition->Emergency Medical Condition (MA) What reading provider will be dictating this exam?->[facility] Comparison: None.  Findings: Perfusion images demonstrate symmetric blood volume Blood flow images demonstrate asymmetric blood flow There is a suggestion of ischemic penumbra in the distribution of the right temporal lobe There is no significant core infarct identified.      Findings suspicious for small region of ischemia in the right temporal lobe This study was analyzed by the [address] ai algorithm.      CTA HEAD W CONTRAST     Result Date: 6/10/2021  Patient MRN:  [id number] : 1940 Age: 80 years Gender: Male Order Date:  6/10/2021 12:01 PM EXAM: CTA NECK W CONTRAST, CTA HEAD W CONTRAST NUMBER OF IMAGES:  36 INDICATION:  cva cva Decision Support Exception - unselect if not a suspected or confirmed emergency medical condition->Emergency Medical Condition (MA) What reading provider will be dictating this exam?->[facility] COMPARISON: None Technique: Low-dose CT  acquisition technique included one of following options; 1 . Automated exposure control, 2. Adjustment of MA and or KV according to patient's size or 3. Use of iterative reconstruction. Contiguous spiral images were obtained in the axial plane, following the administration of intravenous contrast using CT angiographic protocol. Sagittal and coronal images were reconstructed from the axial plane acquisition. Additional MIP reconstructions were presented to aid in the interpretation of this study. Images were obtained from the skull base cranially. There is mild calcified plaque identified in the vessels compatible with atherosclerotic disease. There is aortic arch and great vessels demonstrate mild atherosclerotic disease. The right carotid is abnormal. There is  evidence for hemodynamically significant stenosis at the level the proximal internal carotid artery. By NASCET criteria estimated stenosis is 90% or greater The left carotid is unremarkable. The right vertebral artery is unremarkable. The left vertebral artery is unremarkable. The basilar artery is unremarkable. The middle cerebral arteries are unremarkable. The anterior cerebral arteries are unremarkable. The posterior cerebral arteries are unremarkable.      1. Severe atherosclerotic disease .  2. Estimated stenosis of the proximal right and left internal carotid artery by NASCET criteria is greater than 90% on the right This study was analyzed by the [address] ai algorithm.      CT head 6/11     Impression   Diffuse atrophy likely age related   Findings compatible with small vessel ischemic changes.    Acute infarct in the right posterior frontal lobe   No convincing hemorrhage               Electronically signed by: Samira Bansal DO, 6/11/2021 11:30 AM

## 2021-06-11 NOTE — PROGRESS NOTES
[address]       Date:2021                                                               Patient Name: Jimena Durham  MRN: [id number]  : 1940  Room: [address] [place] [address]A    Evaluating OT: Kd Polanco OTR/ISABEL [id number]    Referring Provider: ALEJANDRO Graf - CNS   Specific Provider Orders/Date: OT eval and treat (6/10/21)       Diagnosis: Acute CVA; R temporal /R ICA     Reason for admission: evaluation of L sided weakness and slurred speech    Surgery/Procedures: s/p tPA     Pertinent Medical History: essential HTN, stroke     *Precautions:  Fall Risk, NPO, SBP<180, L sided weakness & inattention, mild aphasia and slurred speech   **pt goes by \"Melissa\"    Assessment of current deficits   [x] Functional mobility  [x]ADLs  [x] Strength               [x]Cognition   [x] Functional transfers   [x] IADLs         [x] Safety Awareness   [x]Endurance   [x] Fine Coordination        [x] ROM     [x] Vision/perception   [x]Sensation    [x]Gross Motor Coordination [x] Balance   [] Delirium                  [x]Motor Control  [x] Communication    OT PLAN OF CARE   OT POC based on physician orders, patient diagnosis and results of clinical assessment.        Frequency/Duration: 1-3 days/wk for 1-2 weeks PRN    Specific OT Treatment to include:   ADL retraining/adapted techniques and AE recommendations to increase functional independence within precautions                    Energy conservation techniques to improve tolerance for selfcare routine   Functional transfer/mobility training/DME recommendations for increased independence, safety and fall prevention         Patient/family education to increase safety and functional independence within precautions              Environmental modifications for safe mobility and completion of ADLs                           Cognitive retraining ex's to improve problem solving skills & safe participation in ADLs/IADLs  Sensory re-education techniques to improve extremity awareness, maintain skin integrity and improve hand function                             Visual/Perceptual retraining  to improve body awareness and safety during transfers and ADLs  Splinting/positioning needs to maintain joint/skin integrity and prevent contractures  Therapeutic activity to improve functional performance during ADLs/IADLs                                         Therapeutic exercise to improve tolerance and functional strength for ADLs   Balance retraining exercises/tasks for facilitation of postural control with dynamic challenges during ADLs . Positioning to improve functional independence  Neuromuscular re-education: facilitation of righting/equilibrium reactions, normalization muscle tone/facilitation active functional movement                      Delirium prevention/treatment    Modified Dania Scale   Score     Description  0             No symptoms  1             No significant disability despite symptoms  2             Slight disability; able to look after own affairs  3             Moderate disability; able to ambulate without assist/ requires assist with ADLs  4             Moderate/Severe disability;requires assist to ambulate/assist with ADLs  5             Severe disability;bedridden/incontinent   6               Score:   4      Recommended Adaptive Equipment: TBA: ADL AE, shower seat & rails, AD as indicated      Home Living: Pt lives with wife (out of town; children live locally)  in a split level home with 6 steps up/down with rail. bed/bath on upper level. Bathroom setup: walk in shower; tub/shower   Equipment owned: shower seat, WC, [employer], cane (does not use DME); pt owns LB dressing AE due to history of back surgery     Prior Level of Function: IND with ADLs;  IND with IADLs. No device for ambulation.    Driving: yes  Occupation: retired; works part time as     Pain Level: pt c/o R sided head ache pain  this session; does not rate    Cognition: A&O: 4/4    Follows 1-2 step commands appropriately; min cues for perceptual deficits. Pt is easily distracted; requires cues to focus for motor planning. Memory: fair   Comprehension fair; mild apraxia    Problem solving: fair- with cues   Judgement/safety: fair- (easily redirected)               Communication skills: mild dysarthria, word finding difficulties           Vision: decreased tracking to L lower quadrant; L sided inattention               Glasses: none present                                                   Hearing: WFL     RASS: 0  CAM-ICU: (NT) Delirium    UE Assessment:  Hand Dominance: Right [x]  Left []     ROM Strength STM goal: PRN   RUE  WFL 4/5      LUE WFL; impaired FMC and proprioception  4-/5 WFL; 4/5       Sensation:no c/o numbness/ tingling in  extremities (reports UE feels \"heavy\"); finger ID to light touch appropriate. Tone: WFL  Edema: min B hands      Functional Assessment: AM-PAC Daily Activity Raw Score: 11/24   Initial Eval Status  Date: 6/11 Treatment Status  Date: STG=LTG  7-14  days    Feeding NPO                                    Louise  while seated up in chair to increase activity tolerance & hand function   (when cleared for diet)         Grooming Mod A                       SBA   while standing & demonstrating G knowledge of compensatory techniques; using L UE as fair functional assist.     UB dressing/bathing Mod A                        S; set up  demonstrating G initiation and follow through of mariusz dressing techniques and requiring min  cues for L sided body awareness during tasks. LB dressing/bathing Dep;   decreased functional reach;    Max A   using AE  (seated up in chair; cues to use L UE during tasks)                        Min A   using AE as needed for safe reach/ energy conservation       Toileting NT                        Min A     Bed Mobility  Supine to sit: MaxA    Sit to supine: NT                        Min A  in prep of ADL tasks & transfers   Functional Transfers Sit to stand: Mod A    Stand to sit: Mod A                        Min A  sit<>stand/functional bathroom transfers using AD/DME as needed for balance and safety   Functional Mobility Mod A  No device  (posterior/L LOB)                       Min A   functional/bathroom mobility using AD as needed & demonstrating G safety     Balance Sitting:     Static:  Min A    Dynamic:Mod A  Standing: Mod A (posterior LOB)  SBA dynamic sitting balance; Min A dynamic standing balance  during ADL tasks & transfers   Endurance/Activity Tolerance   F tolerance with moderate activity & short breaks. G   tolerance with moderate activity/self care routine   Visual/  Perceptual Impaired:   visual tracking to L side;    L sided body awareness; proprioception    R/L discrimination fairly intact     finger to nose appropriate                         Vitals:   HR at rest: 82 bpm HR at end of session: 82 bpm   Spo2 at rest:95% Spo2 at end of session 94%   BP at rest:158/100 mmHg BP at end of session 152/88 mmHg       Treatment: OT treatment provided this date includes:  Bed mobility: Instruction on precautions prior to bed mobility to facilitate safe transfers and ADLS. HOB elevated to assist; pt required min directional cues. Balance retraining: Performed sitting balance ex's with instruction to facilitate righting reactions with postural changes during ADLS. Pt requires cues to initiate correcting LOB. ADL retraining: Instruction on adapted techniques/AE (reacher and sock aid) to increase independence and safe reach during dressing/bathing activities. Pt demonstrated fair- follow through due to decreased perception of L UE.    ROM/exercise: Pt instructed on B UE ROM; L UE coordination ex's to improve functional use and integration of L UE during ADLS.  Pt demo fair- follow through due to perform tasks. May have comorbidities that affect occupational performance. [] Malnutrition indicators have been identified and nursing has been notified to ensure a dietitian consult is ordered. Time In:  0915          Time Out: 0945         Total Treatment time: 15   Min Units   OT Eval Low 82750     OT Eval Medium 31008 X    OT Eval High 63332     OT Re-Eval [id number]     Therapeutic Ex 97621     Therapeutic Activities 13468 83 1   ADL/Self Care 56219     Orthotic Management 14719     Neuro Re-Ed 39185     Non-Billable Time        Evaluation time includes thorough review of current medical information, gathering information on past medical history/social history and prior level of function, completion of standardized testing/informal observation of tasks, assessment of data and development of POC/Goals.      Svetlana Milton, OTR/L [id number]

## 2021-06-11 NOTE — PROGRESS NOTES
weakness, numbness, paresthesia  Derm:  Rashes, ulcers, excoriations, bruising  Extremities:  Decreased ROM, peripheral edema, mottling      OBJECTIVE:    BP (!) 160/83   Pulse 72   Temp 98 °F (36.7 °C) (Oral)   Resp 15   Ht 5' 6\" (1.676 m)   Wt 218 lb (98.9 kg)   SpO2 94%   BMI 35.19 kg/m²     General Appearance:  awake, alert, and oriented to person, place, time, and purpose; appears stated age and cooperative; no apparent distress no labored breathing  HEENT:  NCAT; PERRL; conjunctiva pink, sclera clear mild left sided facial droop  Neck:  no adenopathy, bruit, JVD, tenderness, masses, or nodules; supple, symmetrical, trachea midline, thyroid not enlarged  Lung:  clear to auscultation bilaterally; no use of accessory muscles; no rhonchi, rales, or crackles  Heart:  regular rate and regular rhythm without murmur, rub, or gallop  Abdomen:  soft, nontender, nondistended; normoactive bowel sounds; no organomegaly  Extremities:  extremities normal, atraumatic, no cyanosis or edema  Musculokeletal:  no joint swelling, no muscle tenderness. ROM normal in all joints of extremities. 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 Check echocardiogram.  Neurochecks. Check lipid panel. PT OT speech. Permissive hypertension. Consider acute rehab. 24 hour repeat CT with no bleed. · Right-sided carotid stenosis-IR consulted from the ER, plan is for carotid stent today  · Elevated troponin- denies chest pain. Continue to trend. EKG shows right bundle branch block. · ? Bibasilar opacities-atelectasis versus pneumonia. Procalcitonin negative.    · Hypertension         DISPOSITION: Continue current plan of care    Medications:  REVIEWED DAILY    Infusion Medications    tirofiban Stopped (06/11/21 1400)    sodium chloride       Scheduled Medications    [START ON 6/12/2021] ticagrelor  90 mg Oral BID    [START ON 6/12/2021] aspirin  81 mg Oral Daily    sodium chloride flush  5-40 mL Intravenous 2 times per day    sennosides-docusate sodium  1 tablet Oral BID    atorvastatin  80 mg Oral Nightly    polyethylene glycol  17 g Oral Daily    pantoprazole  40 mg Intravenous Daily    And    sodium chloride (PF)  10 mL Intravenous Daily     PRN Meds: sodium chloride, sodium chloride flush, ondansetron **OR** ondansetron, labetalol, hydrALAZINE, perflutren lipid microspheres    Labs:     Recent Labs     06/10/21  1230 06/11/21  0545   WBC 8.7 8.1   HGB 14.3 14.3   HCT 44.4 43.2    294       Recent Labs     06/10/21  1230 06/11/21  0545    139   K 4.6 4.3    103   CO2 25 21*   BUN 21 16   CREATININE 1.2 1.2   CALCIUM 9.4 9.4   PHOS  --  3.2       Recent Labs     06/10/21  1230 06/11/21  0545   PROT 7.2 7.3   ALKPHOS 96 93   ALT 14 14   AST 22 26   BILITOT 0.4 0.7       Recent Labs     06/10/21  1200   INR 1.1       No results for input(s): CKTOTAL, TROPONINI in the last 72 hours. Chronic labs:    Lab Results   Component Value Date    CHOL 181 06/11/2021    TRIG 87 06/11/2021    HDL 51 06/11/2021    LDLCALC 113 (H) 06/11/2021    TSH 2.720 06/11/2021    INR 1.1 06/10/2021    LABA1C 5.6 06/11/2021       Radiology: REVIEWED DAILY    +++++++++++++++++++++++++++++++++++++++++++++++++  DO DO Don Ruiz Physician - Hospitalist  [address], [place]  +++++++++++++++++++++++++++++++++++++++++++++++++  NOTE: This report was transcribed using voice recognition software. Every effort was made to ensure accuracy; however, inadvertent computerized transcription errors may be present.

## 2021-06-11 NOTE — PROGRESS NOTES
Surgical  Neuro Science Intensive Care Unit  Critical Care  Daily Progress Note 6/11/2021    Date of Admission: 06/10/2021     CC: Follow up for stroke S/P tpa. .       HOSPITAL COURSE/OVERNIGHT EVENTS:  06/10 81 yo man presented to ED after sudden onset of left sided weakness. While at work. LKW 10:30 am. Initial  BS 98. Initial l /94. NIH SS 7. Diagnostic imaging revealed small area of ischemia in the right temporal area & right ICA stenosis of 90%. Decision made for tpa. IR consulted for evaluation of right carotid stenting.  06/11 No issues overnight. For deshpande    PHYSICAL EXAM:  BP (!) 160/83   Pulse 72   Temp 98 °F (36.7 °C) (Oral)   Resp 15   Ht 5' 6\" (1.676 m)   Wt 218 lb (98.9 kg)   SpO2 94%   BMI 35.19 kg/m²     Intake/Output Summary (Last 24 hours) at 6/11/2021 1223  Last data filed at 6/11/2021 1000  Gross per 24 hour   Intake 110 ml   Output 1300 ml   Net -1190 ml     General appearance:  Comfortable. Pain Description: none    NEUROLOGIC:   RASS Score:  0  GCS:  15  4 - Opens eyes on own   6 - Follows simple motor commands  5 - Alert and oriented       Pupil size:  Left 3 mm  Right 3 mm  Pupil reaction: Yes   PERRLA  Wiggles fingers: Left   Yes  Right Yes  Hand grasp:   Left: decreased   Right    Yes  Wiggles toes: Left   Yes Right  Yes  Plantar flexion: Left  decreased  Right   Yes  Facial droop:   Slight left   Speech:  clear    CONSTITUTIONAL: No acute distress, lying in hospital bed. CARDIOVASCULAR: S1 S2, regular rate, regular rhythm, no murmur/gallop/rub. Monitor: NSR  PULMONARY: Bilaterally clear. No rhonchi/rales/wheezes, no use of accessory muscles. Room air. RENAL: Voids. Fluid balance for previous 24 hours:  - 1.1 L.    ABDOMEN: Soft, nontender, nondistended, nontympanic, normal bowel sounds. Diet:  General,  No reported nausea or vomiting.     MUSCULOSKELETAL:  Moves all extremities purposefully, 5/5 strength  Left sided weakness noted with upper & lower extremities. SKIN/EXTREMITIES: No rashes/ecchymosis, no edema/clubbing, warm/dry, good capillary refill. LINES:  Peripheral     Recent Labs     06/10/21  1230 06/11/21  0545   WBC 8.7 8.1   HGB 14.3 14.3   HCT 44.4 43.2   MCV 92.3 90.9    294       Recent Labs     06/10/21  1230 06/11/21  0545    139   K 4.6 4.3   CO2 25 21*   PHOS  --  3.2   BUN 21 16   CREATININE 1.2 1.2       Recent Labs     06/10/21  1200 06/10/21  1230 06/11/21  0545   PROT  --  7.2 7.3   INR 1.1  --   --        ASSESSMENT/PLAN:     Active Problems:    Acute CVA (cerebrovascular accident) ([facility] [address]  Resolved Problems:    * No resolved hospital problems. *    Neuro:  Right temporal ischemic stroke. Right ICA with 90% stenosis. Monitor neuro status. Neurology following. Stroke education. Stroke protocol. Speech therapy. For carotid stenting today. HCT this am without evidence of intracranial.hemorrhage. CV:  Hypertensive at arrival.  No PMH. Monitor hemodynamics. BP goal systolic less than 157 mm Hg. PRN hydralzine & labetalol. Statin. Echo pending. ASA/Brillinta given. Pulm: At risk for respiratory insufficiency. Monitor RR & SpO2. O2 as needed. Encourage cough, SMI  & deep breathing. GI: will need swallow evaluaiton. Monitor bowel function. NPO. Will need bedside swallow evaluation. Zofran. Bowel regime. Renal:  No acute issues. Monitor BUN & Cr, electrolytes & replace as needed. Monitor I & O.    Voids. ID: No acute issues  Endocrine: No acute issues. Monitor BS.    MSK: Left sided weakness. Deconditioned.   ROM. Turn & reposition. PT & OT   pending recommendations;  Monitor for skin breakdown. Heme: No acute issues. Monitor CBC.       Bowel regime: Senna and Glycolax  Pain control/Sedation: Tylenol  DVT prophylaxis: SCD and No Lovenox/Heparin at this time due to TPA.    GI prophylaxis: Protonix  Ancillary consults: Neurology, Medicine and Critical care. Patient:  Questions answered. Support given. Code status:  Full code.       Disposition:   ICU.       Electronically signed by Conchis Knight RN MSN APRN-NP [facility] NP  CCNS CCRN 6/11/2021 12:32 PM  ;Panfilo Vasquez

## 2021-06-11 NOTE — PROGRESS NOTES
Physical Therapy    [place] [facility]      NAME: Elicia Brumfield  : 1940  MRN: [id number]    Date of Service: 2021  Pt would benefit from acute rehab at discharge and is in need of a PM&R consult.     Ginette Solis, PT, DPT  [id number]

## 2021-06-11 NOTE — PROGRESS NOTES
Physical Therapy  Physical Therapy Initial Assessment     Name: Lin Kuhn  : 1940  MRN: [id number]      Date of Service: 2021    Evaluating PT:  Chela Hodges, PT, DPT [id number]    Room #:  8866/0187-A  Diagnosis:  Acute CVA (cerebrovascular accident) [facility]) [I63.9]  PMHx/PSHx:  Essential HTN, Stroke  Procedure/Surgery:  6/10 tPA  Precautions:  Falls, L neglect, L hemiparesis, L visual deficits, mild aphasia   Equipment Needs:  TBD    SUBJECTIVE:    Pt lives with wife in a split-level home with 1 stairs to enter and no rail. 6 steps and 1 rail to each level. Pt ambulated without device and was independent PTA. Pt works part time as a . OBJECTIVE:   Initial Evaluation  Date: 21 Treatment Short Term/ Long Term   Goals   AM-PAC 6 Clicks      Was pt agreeable to Eval/treatment? Yes     Does pt have pain?  \"Dull\" R BE     Bed Mobility  Rolling: NT  Supine to sit: MaxA  Sit to supine: NT  Scooting: MaxA  SBA   Transfers Sit to stand: [address]  Stand to sit: ModA  Stand pivot: ModA with HHA  SBA with AAD   Ambulation   50 feet with ModA with HHA  >150 feet with SBA with AAD   Stair negotiation: ascended and descended NT  >4 steps with 1 rail SBA   ROM BUE:  Defer to OT note  BLE:  WFL     Strength BUE:  Defer to OT note  BLE:  4/5 grossly  Increase by 1/3 MMT grade   Balance Sitting EOB:  ModA dynamic  Dynamic Standing:  ModA with HHA  Sitting EOB:  SBA  Dynamic Standing:  SBA with AAD     Pt is A & O x 4  CAM-ICU: NT  RASS: 0  Sensation:  Decreased light touch to LLE, specifically S1  Edema:  None    Vitals:  Heart Rate at rest 82 bpm Heart Rate post session 84 bpm   SpO2 at rest 94% SpO2 post session 95%   Blood Pressure at rest 158/100 mmHg Blood Pressure post session 152/88 mmHg       Functional Status Score-Intensive Care Unit (FSS-ICU)   Rolling -/   Supine to sit transfer 2   Unsupported sitting  3   Sit to stand transfers 3   Ambulation    Total  10/35 Therapeutic Exercises:  NA    Patient education  Pt educated on safety    Patient response to education:   Pt verbalized understanding Pt demonstrated skill Pt requires further education in this area   partial partial yes     ASSESSMENT:    Conditions Requiring Skilled Therapeutic Intervention:    [x]Decreased strength     []Decreased ROM  [x]Decreased functional mobility  [x]Decreased balance   [x]Decreased endurance   [x]Decreased posture  [x]Decreased sensation  [x]Decreased coordination   [x]Decreased vision  [x]Decreased safety awareness   []Increased pain       Comments:  NP reported pt was stable for session. Pt was in bed upon arrival, agreeable to initial evaluation. Pt's wife was present for session. Pt was easily distracted by environment with poor attention to tasks and awareness of LUE and LLE. Increased assistance provided for tasks due to L neglect and weakness. Pt demonstrated a posterior lean to the L with upright tasks. Pt ambulated with decreased gait speed and shuffled gait. Fatigue limited mobility. Pt was left in chair with all needs met and call light in reach. All lines remained intact. Education pt's wife to stay on pt's L side, PT POC and therapy options at discharge. Pt would benefit from an intensive rehabilitation program at discharge. RN notified of assessment. Treatment:  Patient practiced and was instructed in the following treatment:     Bed mobility training - pt given verbal and tactile cues to facilitate proper sequencing and safety during supine>sit as well as provided with physical assistance to complete task    Sitting EOB for >8 minutes for upright tolerance, postural awareness and BLE ROM   Transfer training - pt was given verbal and tactile cues to facilitate proper hand placement, technique and safety during sit to stand and stand to sit as well as provided with physical assistance to complete task.    Gait training- pt was given verbal and tactile cues to PT evaluation [id number]  [] High Complexity PT evaluation [id number]  [] PT Re-evaluation [id number]  [] Gait training 56496 - minutes  [] Manual therapy 18976 - minutes  [x] Therapeutic activities 36203 20 minutes  [] Therapeutic exercises 82125 - minutes  [] Neuromuscular reeducation 38019 - minutes     Jesse Gordon, PT, DPT  [id number]

## 2021-06-12 LAB
ALBUMIN SERPL-MCNC: 3.4 G/DL (ref 3.5–5.2)
ALP BLD-CCNC: 81 U/L (ref 40–129)
ALT SERPL-CCNC: 12 U/L (ref 0–40)
ANION GAP SERPL CALCULATED.3IONS-SCNC: 12 MMOL/L (ref 7–16)
AST SERPL-CCNC: 23 U/L (ref 0–39)
BILIRUB SERPL-MCNC: 0.4 MG/DL (ref 0–1.2)
BUN BLDV-MCNC: 21 MG/DL (ref 6–23)
CALCIUM IONIZED: 1.26 MMOL/L (ref 1.15–1.33)
CALCIUM SERPL-MCNC: 8.9 MG/DL (ref 8.6–10.2)
CHLORIDE BLD-SCNC: 103 MMOL/L (ref 98–107)
CO2: 21 MMOL/L (ref 22–29)
CREAT SERPL-MCNC: 1.1 MG/DL (ref 0.7–1.2)
GFR AFRICAN AMERICAN: >60
GFR NON-AFRICAN AMERICAN: >60 ML/MIN/1.73
GLUCOSE BLD-MCNC: 125 MG/DL (ref 74–99)
HCT VFR BLD CALC: 38.9 % (ref 37–54)
HEMOGLOBIN: 12.8 G/DL (ref 12.5–16.5)
MAGNESIUM: 2.1 MG/DL (ref 1.6–2.6)
MCH RBC QN AUTO: 29.7 PG (ref 26–35)
MCHC RBC AUTO-ENTMCNC: 32.9 % (ref 32–34.5)
MCV RBC AUTO: 90.3 FL (ref 80–99.9)
PDW BLD-RTO: 13 FL (ref 11.5–15)
PHOSPHORUS: 3.3 MG/DL (ref 2.5–4.5)
PLATELET # BLD: 306 E9/L (ref 130–450)
PMV BLD AUTO: 9.5 FL (ref 7–12)
POTASSIUM SERPL-SCNC: 4.1 MMOL/L (ref 3.5–5)
RBC # BLD: 4.31 E12/L (ref 3.8–5.8)
SODIUM BLD-SCNC: 136 MMOL/L (ref 132–146)
TOTAL PROTEIN: 6.5 G/DL (ref 6.4–8.3)
WBC # BLD: 12 E9/L (ref 4.5–11.5)

## 2021-06-12 PROCEDURE — 82330 ASSAY OF CALCIUM: CPT

## 2021-06-12 PROCEDURE — 2000000000 HC ICU R&B

## 2021-06-12 PROCEDURE — 2580000003 HC RX 258: Performed by: NURSE PRACTITIONER

## 2021-06-12 PROCEDURE — 36415 COLL VENOUS BLD VENIPUNCTURE: CPT

## 2021-06-12 PROCEDURE — 83735 ASSAY OF MAGNESIUM: CPT

## 2021-06-12 PROCEDURE — 80053 COMPREHEN METABOLIC PANEL: CPT

## 2021-06-12 PROCEDURE — 6370000000 HC RX 637 (ALT 250 FOR IP): Performed by: INTERNAL MEDICINE

## 2021-06-12 PROCEDURE — 6370000000 HC RX 637 (ALT 250 FOR IP): Performed by: NURSE PRACTITIONER

## 2021-06-12 PROCEDURE — 6370000000 HC RX 637 (ALT 250 FOR IP): Performed by: RADIOLOGY

## 2021-06-12 PROCEDURE — 84100 ASSAY OF PHOSPHORUS: CPT

## 2021-06-12 PROCEDURE — APPSS15 APP SPLIT SHARED TIME 0-15 MINUTES: Performed by: NURSE PRACTITIONER

## 2021-06-12 PROCEDURE — 99233 SBSQ HOSP IP/OBS HIGH 50: CPT | Performed by: NURSE PRACTITIONER

## 2021-06-12 PROCEDURE — 92523 SPEECH SOUND LANG COMPREHEN: CPT | Performed by: SPEECH-LANGUAGE PATHOLOGIST

## 2021-06-12 PROCEDURE — 85027 COMPLETE CBC AUTOMATED: CPT

## 2021-06-12 RX ORDER — PANTOPRAZOLE SODIUM 40 MG/1
40 TABLET, DELAYED RELEASE ORAL
Status: DISCONTINUED | OUTPATIENT
Start: 2021-06-12 | End: 2021-06-15 | Stop reason: HOSPADM

## 2021-06-12 RX ORDER — HYDROCHLOROTHIAZIDE 25 MG/1
25 TABLET ORAL DAILY
Status: DISCONTINUED | OUTPATIENT
Start: 2021-06-12 | End: 2021-06-15 | Stop reason: HOSPADM

## 2021-06-12 RX ADMIN — HYDROCHLOROTHIAZIDE 25 MG: 25 TABLET ORAL at 10:55

## 2021-06-12 RX ADMIN — TICAGRELOR 90 MG: 90 TABLET ORAL at 22:02

## 2021-06-12 RX ADMIN — POLYETHYLENE GLYCOL 3350 17 G: 17 POWDER, FOR SOLUTION ORAL at 08:38

## 2021-06-12 RX ADMIN — SODIUM CHLORIDE, PRESERVATIVE FREE 10 ML: 5 INJECTION INTRAVENOUS at 22:02

## 2021-06-12 RX ADMIN — TICAGRELOR 90 MG: 90 TABLET ORAL at 08:38

## 2021-06-12 RX ADMIN — ATORVASTATIN CALCIUM 80 MG: 40 TABLET, FILM COATED ORAL at 22:02

## 2021-06-12 RX ADMIN — PANTOPRAZOLE SODIUM 40 MG: 40 TABLET, DELAYED RELEASE ORAL at 08:38

## 2021-06-12 RX ADMIN — DOCUSATE SODIUM 50 MG AND SENNOSIDES 8.6 MG 1 TABLET: 8.6; 5 TABLET, FILM COATED ORAL at 08:38

## 2021-06-12 RX ADMIN — ASPIRIN 81 MG: 81 TABLET, COATED ORAL at 10:55

## 2021-06-12 RX ADMIN — DOCUSATE SODIUM 50 MG AND SENNOSIDES 8.6 MG 1 TABLET: 8.6; 5 TABLET, FILM COATED ORAL at 22:02

## 2021-06-12 RX ADMIN — SODIUM CHLORIDE, PRESERVATIVE FREE 10 ML: 5 INJECTION INTRAVENOUS at 08:38

## 2021-06-12 ASSESSMENT — PAIN SCALES - GENERAL
PAINLEVEL_OUTOF10: 0

## 2021-06-12 NOTE — PROGRESS NOTES
Surgical  Neuro Science Intensive Care Unit  Critical Care  Daily Progress Note 6/12/2021    Date of Admission: 06/10/2021     CC: Follow up for stroke S/P tpa. .       HOSPITAL COURSE/OVERNIGHT EVENTS:  06/10 81 yo man presented to ED after sudden onset of left sided weakness. While at work. LKW 10:30 am. Initial  BS 98. Initial l /94. NIH SS 7. Diagnostic imaging revealed small area of ischemia in the right temporal area & right ICA stenosis of 90%. Decision made for tpa. IR consulted for evaluation of right carotid stenting.  06/11 No issues overnight. For carotod stenting this afternoon. Returned from procedure. A/O x 3. Following commands. Left sided weakness. 06/12 No issues overnight. PHYSICAL EXAM:  BP (!) 164/74   Pulse 78   Temp 97.8 °F (36.6 °C) (Temporal)   Resp 18   Ht 5' 6\" (1.676 m)   Wt 218 lb (98.9 kg)   SpO2 95%   BMI 35.19 kg/m²     Intake/Output Summary (Last 24 hours) at 6/12/2021 0808  Last data filed at 6/12/2021 0500  Gross per 24 hour   Intake 1158 ml   Output 1130 ml   Net 28 ml     General appearance:  Comfortable. Pain Description: none    NEUROLOGIC:   RASS Score:  0  GCS:  15  4 - Opens eyes on own   6 - Follows simple motor commands  5 - Alert and oriented       Pupil size:  Left 3 mm  Right 3 mm  Pupil reaction: Yes   PERRLA  Wiggles fingers: Left   Yes  Right Yes  Hand grasp:   Left: decreased   Right    Yes  Wiggles toes: Left   Yes Right  Yes  Plantar flexion: Left  decreased  Right   Yes  Facial droop:   Slight left   Speech:  clear    CONSTITUTIONAL: No acute distress, lying in hospital bed. CARDIOVASCULAR: S1 S2, regular rate, regular rhythm, no murmur/gallop/rub. Monitor: NSR  PULMONARY: Bilaterally clear. No rhonchi/rales/wheezes, no use of accessory muscles. Room air. RENAL: Voids. Fluid balance for previous 24 hours:  + 28 ml. ABDOMEN: Soft, nontender, nondistended, nontympanic, normal bowel sounds.   Diet:  General,  No reported nausea or vomiting. MUSCULOSKELETAL:  Moves all extremities purposefully, 5/5 strength  Left sided weakness noted with upper & lower extremities. SKIN/EXTREMITIES: No rashes/ecchymosis, no edema/clubbing, warm/dry, good capillary refill. LINES:   Peripheral     Left radial arterial line. Day 2. Recent Labs     06/10/21  1230 06/11/21  0545 06/12/21  0530   WBC 8.7 8.1 12.0*   HGB 14.3 14.3 12.8   HCT 44.4 43.2 38.9   MCV 92.3 90.9 90.3    294 306       Recent Labs     06/10/21  1230 06/11/21  0545 06/12/21  0530    139 136   K 4.6 4.3 4.1   CO2 25 21* 21*   PHOS  --  3.2 3.3   BUN 21 16 21   CREATININE 1.2 1.2 1.1       Recent Labs     06/10/21  1200 06/10/21  1230 06/11/21  0545 06/12/21  0530   PROT  --  7.2 7.3 6.5   INR 1.1  --   --   --        ASSESSMENT/PLAN:     Active Problems:    Acute CVA (cerebrovascular accident) ([facility] [address]  Resolved Problems:    * No resolved hospital problems. *    Neuro:  Right temporal ischemic stroke. Right ICA with 90% stenosis. S/P tpa administration. S/P carotid stenting. Monitor neuro status. Neurology following. Stroke education. Stroke protocol. Speech therapy. CV:  Hypertensive at arrival.  No PMH. Monitor hemodynamics. BP goal systolic less than 561 mm Hg. PRN hydralzine & labetalol. Statin. Echo pending. ASA  Brillinta   Pulm: At risk for respiratory insufficiency. Monitor RR & SpO2. O2 as needed. Encourage cough, SMI  & deep breathing. GI: will need swallow evaluaiton. Monitor bowel function. Cardiac diet. Zofran. Bowel regime. Renal:  No acute issues. Monitor BUN & Cr, electrolytes & replace as needed. Monitor I & O.    Voids. ID: No acute issues  Endocrine: No acute issues. Monitor BS.    MSK: Left sided weakness. Deconditioned.   ROM. Turn & reposition. PT & OT am pac 11/24. Monitor for skin breakdown. Heme: No acute issues.    Monitor CBC.       Bowel regime: Senna and Glycolax  Pain control/Sedation: Tylenol  DVT prophylaxis: SCD and No Lovenox/Heparin at this time due to TPA. GI prophylaxis: Protonix. Diet. Ancillary consults:  Neurology. IR. PMR. Medicine and Critical care. Patient:  Questions answered. Support given. Code status:  Full code.       Disposition:   ICU.       Electronically signed by Kori Peguero RN MSN APRN-NP [facility] NP  CCNS CCRN 6/12/2021 8:08 AM  ;Loren Roman

## 2021-06-12 NOTE — PROGRESS NOTES
SPEECH/LANGUAGE PATHOLOGY  SPEECH/LANGUAGE/COGNITIVE EVALUATION   and PLAN OF CARE      PATIENT NAME:  Erna Mead  (male)     MRN:  [id number]    :  1940  (80 y.o.)  STATUS:  Inpatient: Room 3820/3820-A    TODAY'S DATE:  2021  REFERRING PROVIDER:    Speech Language Pathology (SLP) eval and treat Start: 06/10/21 1500, End: 06/10/21 1500, ONE TIME, Standing Count: 1 Occurrences, R    Last continued at transfer on Thu Kermit 10, 2021 6:09 PM   Jelly Cardoza DO   REASON FOR REFERRAL: new onset CVA  EVALUATING THERAPIST: SAROJ Marrufo    ADMITTING DIAGNOSIS: Acute CVA (cerebrovascular accident) [facility]) [I63.9]    VISIT DIAGNOSIS:   Visit Diagnoses       Codes    Stenosis of both internal carotid arteries     I65.23           SPEECH THERAPY  PLAN OF CARE   The speech therapy  POC is established based on physician order, speech pathology diagnosis and results of clinical assessment     SPEECH PATHOLOGY DIAGNOSIS:    Mild cognitive deficit; wife present and confirms cognitive deficits     Speech Pathology intervention is recommended 3-6 times per week for LOS or when goals are met with emphasis on the following:      Conditions Requiring Skilled Therapeutic Intervention for speech, language and/or cognition    Cognitive linguistic impairment  Decreased safety awareness  Decreased short term memory  Decreased problem solving skills     Specific Speech Therapy Interventions to Include:    Therapeutic tasks for Cognition    Specific instructions for next treatment:  Cognitive tasks and continue assessment of cognition    SHORT/LONG TERM GOALS  Pt will improve immediate, short term, recent memory during structured and unstructured tasks with 80% accuracy   Pt will improve problem solving/thought organization during structured and unstructured tasks with 80% accuracy        Patient goals: Patient/family involved in developing goals and treatment plan:   Treatment goals discussed with Patient and Family The Patient and Family understand(s) the diagnosis, prognosis and plan of care   The patient/family Agreed with above,     This plan may be re-evaluated and revised as warranted. Rehabilitation Potential/Prognosis: good                CLINICAL ASSESSMENT:  MOTOR SPEECH       Oral Peripheral Examination   Generalized oral weakness-decreased lingual and labial ROM, however no dysarthria noted    Parameters of Speech Production  Respiration:  Adequate for speech production  Articulation:  Within functional limits  Resonance:  Within functional limits  Quality:   Within functional limits  Pitch: Within functional limits  Intensity: Within functional limits  Fluency:  Intact  Prosody Monotone    RECEPTIVE LANGUAGE    Comprehension of Yes/No Questions:    Within functional limits    Process  Simple Verbal Commands:   Within functional limits  Process Intermediate Verbal Commands:   Within functional limits  Process Complex Verbal Commands:     Within functional limits    Comprehension of Conversation:      Cueing      EXPRESSIVE LANGUAGE     Serials: Functional    Imitation:  Words   Functional   Sentences Functional    Naming:  (Modality used:  Verbal)  Confrontation Naming  Functional  Functional Description  Functional  Response Naming: Functional    Conversation:      Confusion was noted during conversation    COGNITION     Attention/Orientation  Attention: Sustained attention   Orientation:  Oriented to Person, Place, Date, Reason for hospitalization    Memory   Immediate Recall: Repeated 3/3    Delayed Recall:   Recalled 2/3    Long Term Recall:   Recalled Address, Birthdate and Age    Organization/Problem Solving/Reasoning   Verbal Sequencing:   Functional        Verbal Problem solving:   Functional          CLINICAL OBSERVATIONS NOTED DURING THE EVALUATION  Cueing was required                  EDUCATION:   The Speech Language Pathologist (SLP) completed education regarding results of evaluation and that intervention is warranted at this time. Learner: Patient and Family  Education: Reviewed results and recommendations of this evaluation  Evaluation of Education:  Verbalizes understanding    Evaluation Time includes thorough review of current medical information, gathering information on past medical history/social history and prior level of function, completion of standardized testing/informal observation of tasks, assessment of data and education on plan of care and goals. CPT code:    23926  eval speech sound lang comprehension      The admitting diagnosis and active problem list, as listed below have been reviewed prior to initiation of this evaluation. ACTIVE PROBLEM LIST:   Patient Active Problem List   Diagnosis    [facility]       Lambert Schooling A. CCC/SLP [id number]  Speech-Language Pathologist

## 2021-06-12 NOTE — PLAN OF CARE
Problem: Falls - Risk of:  Goal: Will remain free from falls  Outcome: Met This Shift  Goal: Absence of physical injury  Outcome: Met This Shift     Problem: HEMODYNAMIC STATUS  Goal: Patient has stable vital signs and fluid balance  Outcome: Met This Shift     Problem: COMMUNICATION IMPAIRMENT  Goal: Ability to express needs and understand communication  Outcome: Met This Shift     Problem: Discharge Planning:  Goal: Participates in care planning  Outcome: Met This Shift  Goal: Discharged to appropriate level of care  Outcome: Met This Shift     Problem: Aspiration:  Goal: Absence of aspiration  Outcome: Met This Shift     Problem:  Bowel Function - Altered:  Goal: Bowel elimination is within specified parameters  Outcome: Met This Shift     Problem: Cardiac Output - Decreased:  Goal: Hemodynamic stability will improve  Outcome: Met This Shift     Problem: Fluid Volume - Imbalance:  Goal: Absence of imbalanced fluid volume signs and symptoms  Outcome: Met This Shift     Problem: Mental Status - Impaired:  Goal: Mental status will be restored to baseline  Outcome: Met This Shift     Problem: Pain:  Goal: Recognizes and communicates pain  Outcome: Met This Shift  Goal: Control of acute pain  Outcome: Met This Shift     Problem: Skin Integrity - Impaired:  Goal: Will show no infection signs and symptoms  Outcome: Met This Shift  Goal: Absence of new skin breakdown  Outcome: Met This Shift     Problem: Sleep Pattern Disturbance:  Goal: Appears well-rested  Outcome: Met This Shift     Problem: Tissue Perfusion, Altered:  Goal: Circulatory function within specified parameters  Outcome: Met This Shift     Problem: Tissue Perfusion - Cardiopulmonary, Altered:  Goal: Hemodynamic stability will improve  Outcome: Met This Shift     Problem: Pain:  Goal: Control of acute pain  Outcome: Met This Shift     Problem: ACTIVITY INTOLERANCE/IMPAIRED MOBILITY  Goal: Mobility/activity is maintained at optimum level for patient Outcome: Ongoing     Problem: Neurological  Goal: Maximum potential motor/sensory/cognitive function  Outcome: Ongoing     Problem: Airway Clearance - Ineffective:  Goal: Ability to maintain a clear airway will improve  Outcome: Completed     Problem: Anxiety/Stress:  Goal: Level of anxiety will decrease  Outcome: Completed     Problem: Gas Exchange - Impaired:  Goal: Levels of oxygenation will improve  Outcome: Completed     Problem: Nutrition Deficit:  Goal: Ability to achieve adequate nutritional intake will improve  Outcome: Completed     Problem: Pain:  Goal: Pain level will decrease  Outcome: Completed  Goal: Control of chronic pain  Outcome: Completed     Problem: Serum Glucose Level - Abnormal:  Goal: Ability to maintain appropriate glucose levels will improve to within specified parameters  Outcome: Completed     Problem: Tissue Perfusion - Cardiopulmonary, Altered:  Goal: Absence of angina  Outcome: Completed     Problem: Pain:  Goal: Pain level will decrease  Outcome: Completed  Goal: Control of chronic pain  Outcome: Completed

## 2021-06-12 NOTE — PROGRESS NOTES
Hospitalist Progress Note      SYNOPSIS: Patient admitted on 6/10/2021 Yamila patient presented to the ER with sudden onset of left-sided weakness and numbness which began earlier this morning around 1030. He was brought to the ER. Stroke alert was called he has a history of hypertension. In the ER he had an NIH stroke score of 7. CT of the head showed findings suspicious for small region of ischemia in the right temporal lobe. CTA showed right carotid stenosis of 90%. Chest x-ray showed bibasilar pulmonary opacities which may represent atelectasis. He denies any chest pain or shortness of breath.     He was given tPA in the ER. He will be admitted to neuro ICU. Plans are for possibly carotid stent. SUBJECTIVE:  Stable overnight. No other overnight issues reported. Patient seen and examined  Records reviewed. Had carotid stent placed yesterday  Wife  At bedside, they are wondering if he can do inpatient rehab somewhere closer to where they live, in [place]. Temp (24hrs), Av °F (36.7 °C), Min:97.8 °F (36.6 °C), Max:98.2 °F (36.8 °C)    DIET: ADULT DIET; Regular; Low Fat/Low Chol/High Fiber/2 gm Na  CODE: Full Code    Intake/Output Summary (Last 24 hours) at 2021 0809  Last data filed at 2021 0500  Gross per 24 hour   Intake 1158 ml   Output 1130 ml   Net 28 ml       Review of Systems  All bolded are positive; please see HPI  General:  Fever, chills, diaphoresis, fatigue, malaise, night sweats, weight loss  Psychological:  Anxiety, disorientation, hallucinations. ENT:  Epistaxis, headaches, vertigo, visual changes. Cardiovascular:  Chest pain, irregular heartbeats, palpitations, paroxysmal nocturnal dyspnea. Respiratory:  Shortness of breath, coughing, sputum production, hemoptysis, wheezing, orthopnea.   Gastrointestinal:  Nausea, vomiting, diarrhea, heartburn, constipation, abdominal pain, hematemesis, hematochezia, melena, acholic stools  Genito-Urinary:  Dysuria, urgency, frequency, hematuria  Musculoskeletal:  Joint pain, joint stiffness, joint swelling, muscle pain  Neurology:  Headache, focal neurological deficits, weakness, numbness, paresthesia  Derm:  Rashes, ulcers, excoriations, bruising  Extremities:  Decreased ROM, peripheral edema, mottling      OBJECTIVE:    BP (!) 164/74   Pulse 78   Temp 97.8 °F (36.6 °C) (Temporal)   Resp 18   Ht 5' 6\" (1.676 m)   Wt 218 lb (98.9 kg)   SpO2 95%   BMI 35.19 kg/m²     General Appearance:  awake, alert, and oriented to person, place, time, and purpose; appears stated age and cooperative; no apparent distress no labored breathing  HEENT:  NCAT; PERRL; conjunctiva pink, sclera clear mild left sided facial droop  Neck:  no adenopathy, bruit, JVD, tenderness, masses, or nodules; supple, symmetrical, trachea midline, thyroid not enlarged  Lung:  clear to auscultation bilaterally; no use of accessory muscles; no rhonchi, rales, or crackles  Heart:  regular rate and regular rhythm without murmur, rub, or gallop  Abdomen:  soft, nontender, nondistended; normoactive bowel sounds; no organomegaly  Extremities:  extremities normal, atraumatic, no cyanosis or edema  Musculokeletal:  no joint swelling, no muscle tenderness. ROM normal in all joints of extremities. 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 Check echocardiogram.  Neurochecks. Check lipid panel. PT OT speech. Permissive hypertension. Consider acute rehab. Patient and wife asking if he could go to acute rehab somewhere closer to where they live, in [place]. 24 hour repeat CT with no bleed. · Right-sided carotid stenosis- S/p carotid stenting 6/11  · Elevated troponin- denies chest pain. Continue to trend.   EKG shows right bundle branch block. · ? Bibasilar opacities-atelectasis versus pneumonia. Procalcitonin negative. · Hypertension         DISPOSITION: Continue current plan of care    Medications:  REVIEWED DAILY    Infusion Medications    tirofiban Stopped (06/11/21 1400)    niCARdipene (CARDENE) 50 mg in 250 mL 0.9 % sodium chloride infusion      sodium chloride 100 mL/hr at 06/12/21 0000     Scheduled Medications    ticagrelor  90 mg Oral BID    aspirin  81 mg Oral Daily    sodium chloride flush  5-40 mL Intravenous 2 times per day    sennosides-docusate sodium  1 tablet Oral BID    atorvastatin  80 mg Oral Nightly    polyethylene glycol  17 g Oral Daily    pantoprazole  40 mg Intravenous Daily    And    sodium chloride (PF)  10 mL Intravenous Daily     PRN Meds: sodium chloride, sodium chloride flush, ondansetron **OR** ondansetron, labetalol, hydrALAZINE, perflutren lipid microspheres    Labs:     Recent Labs     06/10/21  1230 06/11/21  0545 06/12/21  0530   WBC 8.7 8.1 12.0*   HGB 14.3 14.3 12.8   HCT 44.4 43.2 38.9    294 306       Recent Labs     06/10/21  1230 06/11/21  0545 06/12/21  0530    139 136   K 4.6 4.3 4.1    103 103   CO2 25 21* 21*   BUN 21 16 21   CREATININE 1.2 1.2 1.1   CALCIUM 9.4 9.4 8.9   PHOS  --  3.2 3.3       Recent Labs     06/10/21  1230 06/11/21  0545 06/12/21  0530   PROT 7.2 7.3 6.5   ALKPHOS 96 93 81   ALT 14 14 12   AST 22 26 23   BILITOT 0.4 0.7 0.4       Recent Labs     06/10/21  1200   INR 1.1       No results for input(s): CKTOTAL, TROPONINI in the last 72 hours.     Chronic labs:    Lab Results   Component Value Date    CHOL 181 06/11/2021    TRIG 87 06/11/2021    HDL 51 06/11/2021    LDLCALC 113 (H) 06/11/2021    TSH 2.720 06/11/2021    INR 1.1 06/10/2021    LABA1C 5.6 06/11/2021       Radiology: REVIEWED DAILY    +++++++++++++++++++++++++++++++++++++++++++++++++  DO DO Don Mullen Physician - [address], [place] [id number] [address]  +++++++++++++++++++++++++++++++++++++++++++++++++  NOTE: This report was transcribed using voice recognition software. Every effort was made to ensure accuracy; however, inadvertent computerized transcription errors may be present.

## 2021-06-12 NOTE — PLAN OF CARE
Problem: Falls - Risk of:  Goal: Will remain free from falls  Description: Will remain free from falls  Outcome: Met This Shift  Goal: Absence of physical injury  Description: Absence of physical injury  Outcome: Met This Shift     Problem: HEMODYNAMIC STATUS  Goal: Patient has stable vital signs and fluid balance  Outcome: Met This Shift     Problem: ACTIVITY INTOLERANCE/IMPAIRED MOBILITY  Goal: Mobility/activity is maintained at optimum level for patient  Outcome: Met This Shift     Problem: COMMUNICATION IMPAIRMENT  Goal: Ability to express needs and understand communication  Outcome: Met This Shift     Problem: Discharge Planning:  Goal: Participates in care planning  Description: Participates in care planning  Outcome: Met This Shift     Problem: Aspiration:  Goal: Absence of aspiration  Description: Absence of aspiration  Outcome: Met This Shift     Problem:  Bowel Function - Altered:  Goal: Bowel elimination is within specified parameters  Description: Bowel elimination is within specified parameters  Outcome: Met This Shift     Problem: Cardiac Output - Decreased:  Goal: Hemodynamic stability will improve  Description: Hemodynamic stability will improve  Outcome: Met This Shift     Problem: Fluid Volume - Imbalance:  Goal: Absence of imbalanced fluid volume signs and symptoms  Description: Absence of imbalanced fluid volume signs and symptoms  Outcome: Met This Shift     Problem: Mental Status - Impaired:  Goal: Mental status will be restored to baseline  Description: Mental status will be restored to baseline  Outcome: Met This Shift     Problem: Pain:  Goal: Recognizes and communicates pain  Description: Recognizes and communicates pain  Outcome: Met This Shift

## 2021-06-12 NOTE — CARE COORDINATION
Patient from [place], [place]. He is asking about acute rehab options in his area. Reviewed medicare. gov list with patient. He would prefer [facility] in [place] as his primary acute rehab option. [phone] Explained that we can follow up on Monday when admissions is available. For questions I can be reached at [phone]. Lesley Cano, [place]    The Plan for Transition of Care is related to the following treatment goals: discharge planning when stable     The Patient and/or patient representative Erna Mead was provided with a choice of provider and agrees   with the discharge plan. [x] Yes [] No    Freedom of choice list was provided with basic dialogue that supports the patient's individualized plan of care/goals, treatment preferences and shares the quality data associated with the providers.  [x] Yes [] No

## 2021-06-13 LAB
ANION GAP SERPL CALCULATED.3IONS-SCNC: 11 MMOL/L (ref 7–16)
BUN BLDV-MCNC: 25 MG/DL (ref 6–23)
CALCIUM IONIZED: 1.29 MMOL/L (ref 1.15–1.33)
CALCIUM SERPL-MCNC: 8.9 MG/DL (ref 8.6–10.2)
CHLORIDE BLD-SCNC: 104 MMOL/L (ref 98–107)
CO2: 25 MMOL/L (ref 22–29)
CREAT SERPL-MCNC: 1.3 MG/DL (ref 0.7–1.2)
GFR AFRICAN AMERICAN: >60
GFR NON-AFRICAN AMERICAN: 53 ML/MIN/1.73
GLUCOSE BLD-MCNC: 99 MG/DL (ref 74–99)
HCT VFR BLD CALC: 42.4 % (ref 37–54)
HEMOGLOBIN: 13.8 G/DL (ref 12.5–16.5)
MAGNESIUM: 3.2 MG/DL (ref 1.6–2.6)
MCH RBC QN AUTO: 29.8 PG (ref 26–35)
MCHC RBC AUTO-ENTMCNC: 32.5 % (ref 32–34.5)
MCV RBC AUTO: 91.6 FL (ref 80–99.9)
PDW BLD-RTO: 13.3 FL (ref 11.5–15)
PHOSPHORUS: 2.9 MG/DL (ref 2.5–4.5)
PLATELET # BLD: 324 E9/L (ref 130–450)
PMV BLD AUTO: 9.7 FL (ref 7–12)
POTASSIUM SERPL-SCNC: 3.8 MMOL/L (ref 3.5–5)
RBC # BLD: 4.63 E12/L (ref 3.8–5.8)
SODIUM BLD-SCNC: 140 MMOL/L (ref 132–146)
WBC # BLD: 10.8 E9/L (ref 4.5–11.5)

## 2021-06-13 PROCEDURE — 2580000003 HC RX 258: Performed by: NURSE PRACTITIONER

## 2021-06-13 PROCEDURE — 82330 ASSAY OF CALCIUM: CPT

## 2021-06-13 PROCEDURE — 6370000000 HC RX 637 (ALT 250 FOR IP): Performed by: NURSE PRACTITIONER

## 2021-06-13 PROCEDURE — 84100 ASSAY OF PHOSPHORUS: CPT

## 2021-06-13 PROCEDURE — 6370000000 HC RX 637 (ALT 250 FOR IP): Performed by: INTERNAL MEDICINE

## 2021-06-13 PROCEDURE — 2060000000 HC ICU INTERMEDIATE R&B

## 2021-06-13 PROCEDURE — 85027 COMPLETE CBC AUTOMATED: CPT

## 2021-06-13 PROCEDURE — 80048 BASIC METABOLIC PNL TOTAL CA: CPT

## 2021-06-13 PROCEDURE — 83735 ASSAY OF MAGNESIUM: CPT

## 2021-06-13 PROCEDURE — 6370000000 HC RX 637 (ALT 250 FOR IP): Performed by: RADIOLOGY

## 2021-06-13 RX ADMIN — DOCUSATE SODIUM 50 MG AND SENNOSIDES 8.6 MG 1 TABLET: 8.6; 5 TABLET, FILM COATED ORAL at 08:37

## 2021-06-13 RX ADMIN — HYDROCHLOROTHIAZIDE 25 MG: 25 TABLET ORAL at 08:37

## 2021-06-13 RX ADMIN — TICAGRELOR 90 MG: 90 TABLET ORAL at 08:37

## 2021-06-13 RX ADMIN — TICAGRELOR 90 MG: 90 TABLET ORAL at 21:29

## 2021-06-13 RX ADMIN — SODIUM CHLORIDE, PRESERVATIVE FREE 10 ML: 5 INJECTION INTRAVENOUS at 08:37

## 2021-06-13 RX ADMIN — ATORVASTATIN CALCIUM 80 MG: 40 TABLET, FILM COATED ORAL at 21:28

## 2021-06-13 RX ADMIN — DOCUSATE SODIUM 50 MG AND SENNOSIDES 8.6 MG 1 TABLET: 8.6; 5 TABLET, FILM COATED ORAL at 21:28

## 2021-06-13 RX ADMIN — ASPIRIN 81 MG: 81 TABLET, COATED ORAL at 08:37

## 2021-06-13 RX ADMIN — SODIUM CHLORIDE, PRESERVATIVE FREE 10 ML: 5 INJECTION INTRAVENOUS at 21:29

## 2021-06-13 RX ADMIN — PANTOPRAZOLE SODIUM 40 MG: 40 TABLET, DELAYED RELEASE ORAL at 06:46

## 2021-06-13 ASSESSMENT — PAIN SCALES - GENERAL
PAINLEVEL_OUTOF10: 0

## 2021-06-13 NOTE — PLAN OF CARE
Problem: Falls - Risk of:  Goal: Will remain free from falls  Description: Will remain free from falls  6/13/2021 1956 by Arpan Ornelas RN  Outcome: Ongoing  6/13/2021 0848 by Luis Alberto Nation RN  Outcome: Met This Shift  Goal: Absence of physical injury  Description: Absence of physical injury  6/13/2021 1956 by Arpan Ornelas RN  Outcome: Ongoing  6/13/2021 0848 by Luis Alberto Nation RN  Outcome: Met This Shift     Problem: HEMODYNAMIC STATUS  Goal: Patient has stable vital signs and fluid balance  6/13/2021 1956 by Arpan Ornelas RN  Outcome: Ongoing  6/13/2021 0848 by Luis Alberto Nation RN  Outcome: Met This Shift     Problem: ACTIVITY INTOLERANCE/IMPAIRED MOBILITY  Goal: Mobility/activity is maintained at optimum level for patient  6/13/2021 1956 by Arpan Ornelas RN  Outcome: Ongoing  6/13/2021 0848 by Luis Alberto Nation RN  Outcome: Met This Shift     Problem: COMMUNICATION IMPAIRMENT  Goal: Ability to express needs and understand communication  Outcome: Ongoing     Problem: Discharge Planning:  Goal: Participates in care planning  Description: Participates in care planning  Outcome: Ongoing  Goal: Discharged to appropriate level of care  Description: Discharged to appropriate level of care  Outcome: Ongoing     Problem: Aspiration:  Goal: Absence of aspiration  Description: Absence of aspiration  Outcome: Ongoing     Problem:  Bowel Function - Altered:  Goal: Bowel elimination is within specified parameters  Description: Bowel elimination is within specified parameters  Outcome: Ongoing     Problem: Cardiac Output - Decreased:  Goal: Hemodynamic stability will improve  Description: Hemodynamic stability will improve  Outcome: Ongoing     Problem: Fluid Volume - Imbalance:  Goal: Absence of imbalanced fluid volume signs and symptoms  Description: Absence of imbalanced fluid volume signs and symptoms  Outcome: Ongoing     Problem: Mental Status - Impaired:  Goal: Mental status will be restored to baseline  Description: Mental status will be restored to baseline  Outcome: Ongoing     Problem: Pain:  Goal: Recognizes and communicates pain  Description: Recognizes and communicates pain  Outcome: Ongoing  Goal: Control of acute pain  Description: Control of acute pain  Outcome: Ongoing     Problem: Skin Integrity - Impaired:  Goal: Will show no infection signs and symptoms  Description: Will show no infection signs and symptoms  Outcome: Ongoing  Goal: Absence of new skin breakdown  Description: Absence of new skin breakdown  Outcome: Ongoing     Problem: Sleep Pattern Disturbance:  Goal: Appears well-rested  Description: Appears well-rested  Outcome: Ongoing     Problem: Tissue Perfusion, Altered:  Goal: Circulatory function within specified parameters  Description: Circulatory function within specified parameters  Outcome: Ongoing     Problem: Tissue Perfusion - Cardiopulmonary, Altered:  Goal: Hemodynamic stability will improve  Description: Hemodynamic stability will improve  Outcome: Ongoing     Problem: Pain:  Goal: Control of acute pain  Description: Control of acute pain  Outcome: Ongoing     Problem: Neurological  Goal: Maximum potential motor/sensory/cognitive function  Outcome: Ongoing

## 2021-06-13 NOTE — PROGRESS NOTES
Hospitalist Progress Note      SYNOPSIS: Patient admitted on 6/10/2021 Yamila patient presented to the ER with sudden onset of left-sided weakness and numbness which began earlier this morning around 1030. He was brought to the ER. Stroke alert was called he has a history of hypertension. In the ER he had an NIH stroke score of 7. CT of the head showed findings suspicious for small region of ischemia in the right temporal lobe. CTA showed right carotid stenosis of 90%. Chest x-ray showed bibasilar pulmonary opacities which may represent atelectasis. He denies any chest pain or shortness of breath.     He was given tPA in the ER. He will be admitted to neuro ICU. Plans are for possibly carotid stent. SUBJECTIVE:  Stable overnight. No other overnight issues reported. Patient seen and examined  Records reviewed. No acute complaints      Temp (24hrs), Av.9 °F (36.6 °C), Min:97.6 °F (36.4 °C), Max:98.2 °F (36.8 °C)    DIET: ADULT DIET; Regular; Low Fat/Low Chol/High Fiber/2 gm Na  CODE: Full Code    Intake/Output Summary (Last 24 hours) at 2021 0726  Last data filed at 2021 0400  Gross per 24 hour   Intake 720 ml   Output 1800 ml   Net -1080 ml       Review of Systems  All bolded are positive; please see HPI  General:  Fever, chills, diaphoresis, fatigue, malaise, night sweats, weight loss  Psychological:  Anxiety, disorientation, hallucinations. ENT:  Epistaxis, headaches, vertigo, visual changes. Cardiovascular:  Chest pain, irregular heartbeats, palpitations, paroxysmal nocturnal dyspnea. Respiratory:  Shortness of breath, coughing, sputum production, hemoptysis, wheezing, orthopnea.   Gastrointestinal:  Nausea, vomiting, diarrhea, heartburn, constipation, abdominal pain, hematemesis, hematochezia, melena, acholic stools  Genito-Urinary:  Dysuria, urgency, frequency, hematuria  Musculoskeletal:  Joint pain, joint stiffness, joint swelling, muscle pain  Neurology:  Headache, focal neurological deficits, weakness, numbness, paresthesia  Derm:  Rashes, ulcers, excoriations, bruising  Extremities:  Decreased ROM, peripheral edema, mottling      OBJECTIVE:    BP (!) 156/72   Pulse 59   Temp 97.9 °F (36.6 °C) (Oral)   Resp 16   Ht 5' 6\" (1.676 m)   Wt 218 lb (98.9 kg)   SpO2 96%   BMI 35.19 kg/m²     General Appearance:  awake, alert, and oriented to person, place, time, and purpose; appears stated age and cooperative; no apparent distress no labored breathing  HEENT:  NCAT; PERRL; conjunctiva pink, sclera clear mild left sided facial droop  Neck:  no adenopathy, bruit, JVD, tenderness, masses, or nodules; supple, symmetrical, trachea midline, thyroid not enlarged  Lung:  clear to auscultation bilaterally; no use of accessory muscles; no rhonchi, rales, or crackles  Heart:  regular rate and regular rhythm without murmur, rub, or gallop  Abdomen:  soft, nontender, nondistended; normoactive bowel sounds; no organomegaly  Extremities:  extremities normal, atraumatic, no cyanosis or edema  Musculokeletal:  no joint swelling, no muscle tenderness. ROM normal in all joints of extremities. 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 Check echocardiogram.  Neurochecks. PT OT speech. Permissive hypertension. Consider acute rehab. Patient and wife asking if he could go to acute rehab somewhere closer to where they live, in [place]. 24 hour repeat CT with no bleed. · Right-sided carotid stenosis- S/p carotid stenting 6/11  · Elevated troponin- denies chest pain. Continue to trend. EKG shows right bundle branch block. · ? Bibasilar opacities-atelectasis versus pneumonia. Procalcitonin negative.    · Hypertension DISPOSITION: Continue current plan of care    Medications:  REVIEWED DAILY    Infusion Medications    sodium chloride 100 mL/hr at 06/12/21 0000     Scheduled Medications    pantoprazole  40 mg Oral QAM AC    hydroCHLOROthiazide  25 mg Oral Daily    ticagrelor  90 mg Oral BID    aspirin  81 mg Oral Daily    sodium chloride flush  5-40 mL Intravenous 2 times per day    sennosides-docusate sodium  1 tablet Oral BID    atorvastatin  80 mg Oral Nightly    polyethylene glycol  17 g Oral Daily     PRN Meds: sodium chloride, sodium chloride flush, ondansetron **OR** ondansetron, labetalol, hydrALAZINE, perflutren lipid microspheres    Labs:     Recent Labs     06/11/21  0545 06/12/21  0530 06/13/21  0515   WBC 8.1 12.0* 10.8   HGB 14.3 12.8 13.8   HCT 43.2 38.9 42.4    306 324       Recent Labs     06/11/21  0545 06/12/21  0530 06/13/21  0515    136 140   K 4.3 4.1 3.8    103 104   CO2 21* 21* 25   BUN 16 21 25*   CREATININE 1.2 1.1 1.3*   CALCIUM 9.4 8.9 8.9   PHOS 3.2 3.3 2.9       Recent Labs     06/10/21  1230 06/11/21  0545 06/12/21  0530   PROT 7.2 7.3 6.5   ALKPHOS 96 93 81   ALT 14 14 12   AST 22 26 23   BILITOT 0.4 0.7 0.4       Recent Labs     06/10/21  1200   INR 1.1       No results for input(s): CKTOTAL, TROPONINI in the last 72 hours. Chronic labs:    Lab Results   Component Value Date    CHOL 181 06/11/2021    TRIG 87 06/11/2021    HDL 51 06/11/2021    LDLCALC 113 (H) 06/11/2021    TSH 2.720 06/11/2021    INR 1.1 06/10/2021    LABA1C 5.6 06/11/2021       Radiology: REVIEWED DAILY    +++++++++++++++++++++++++++++++++++++++++++++++++  DO DO Don Worthy Physician - Hospitalist  [address] [facility]  +++++++++++++++++++++++++++++++++++++++++++++++++  NOTE: This report was transcribed using voice recognition software. Every effort was made to ensure accuracy; however, inadvertent computerized transcription errors may be present.

## 2021-06-13 NOTE — PLAN OF CARE
Problem: Falls - Risk of:  Goal: Will remain free from falls  Description: Will remain free from falls  Outcome: Met This Shift     Problem: Falls - Risk of:  Goal: Absence of physical injury  Description: Absence of physical injury  Outcome: Met This Shift     Problem: HEMODYNAMIC STATUS  Goal: Patient has stable vital signs and fluid balance  Outcome: Met This Shift     Problem: ACTIVITY INTOLERANCE/IMPAIRED MOBILITY  Goal: Mobility/activity is maintained at optimum level for patient  Outcome: Met This Shift

## 2021-06-14 LAB
LV EF: 63 %
LVEF MODALITY: NORMAL

## 2021-06-14 PROCEDURE — 6370000000 HC RX 637 (ALT 250 FOR IP): Performed by: NURSE PRACTITIONER

## 2021-06-14 PROCEDURE — 2060000000 HC ICU INTERMEDIATE R&B

## 2021-06-14 PROCEDURE — 93306 TTE W/DOPPLER COMPLETE: CPT

## 2021-06-14 PROCEDURE — 97535 SELF CARE MNGMENT TRAINING: CPT

## 2021-06-14 PROCEDURE — 2580000003 HC RX 258: Performed by: NURSE PRACTITIONER

## 2021-06-14 PROCEDURE — 97530 THERAPEUTIC ACTIVITIES: CPT

## 2021-06-14 RX ADMIN — DOCUSATE SODIUM 50 MG AND SENNOSIDES 8.6 MG 1 TABLET: 8.6; 5 TABLET, FILM COATED ORAL at 20:06

## 2021-06-14 RX ADMIN — SODIUM CHLORIDE, PRESERVATIVE FREE 10 ML: 5 INJECTION INTRAVENOUS at 09:30

## 2021-06-14 RX ADMIN — ATORVASTATIN CALCIUM 80 MG: 40 TABLET, FILM COATED ORAL at 20:06

## 2021-06-14 RX ADMIN — HYDROCHLOROTHIAZIDE 25 MG: 25 TABLET ORAL at 09:30

## 2021-06-14 RX ADMIN — TICAGRELOR 90 MG: 90 TABLET ORAL at 20:06

## 2021-06-14 RX ADMIN — PANTOPRAZOLE SODIUM 40 MG: 40 TABLET, DELAYED RELEASE ORAL at 05:46

## 2021-06-14 RX ADMIN — TICAGRELOR 90 MG: 90 TABLET ORAL at 09:31

## 2021-06-14 RX ADMIN — ASPIRIN 81 MG: 81 TABLET, COATED ORAL at 09:30

## 2021-06-14 RX ADMIN — SODIUM CHLORIDE, PRESERVATIVE FREE 10 ML: 5 INJECTION INTRAVENOUS at 20:06

## 2021-06-14 ASSESSMENT — PAIN SCALES - GENERAL
PAINLEVEL_OUTOF10: 0

## 2021-06-14 NOTE — PROGRESS NOTES
Hospitalist Progress Note      SYNOPSIS: Patient admitted on 6/10/2021 Yamila patient presented to the ER with sudden onset of left-sided weakness and numbness which began earlier this morning around 1030. He was brought to the ER. Stroke alert was called he has a history of hypertension. In the ER he had an NIH stroke score of 7. CT of the head showed findings suspicious for small region of ischemia in the right temporal lobe. CTA showed right carotid stenosis of 90%. Chest x-ray showed bibasilar pulmonary opacities which may represent atelectasis. He denies any chest pain or shortness of breath.     He was given tPA in the ER. He will be admitted to neuro ICU. Plans are for possibly carotid stent. SUBJECTIVE:  Stable overnight. No other overnight issues reported. Patient seen and examined  Records reviewed. No acute complaints  Awaiting echocardiogram      Temp (24hrs), Av °F (36.7 °C), Min:97.2 °F (36.2 °C), Max:98.7 °F (37.1 °C)    DIET: ADULT DIET; Regular; Low Fat/Low Chol/High Fiber/2 gm Na  CODE: Full Code    Intake/Output Summary (Last 24 hours) at 2021 1024  Last data filed at 2021 0548  Gross per 24 hour   Intake 240 ml   Output 1500 ml   Net -1260 ml       Review of Systems  All bolded are positive; please see HPI  General:  Fever, chills, diaphoresis, fatigue, malaise, night sweats, weight loss  Psychological:  Anxiety, disorientation, hallucinations. ENT:  Epistaxis, headaches, vertigo, visual changes. Cardiovascular:  Chest pain, irregular heartbeats, palpitations, paroxysmal nocturnal dyspnea. Respiratory:  Shortness of breath, coughing, sputum production, hemoptysis, wheezing, orthopnea.   Gastrointestinal:  Nausea, vomiting, diarrhea, heartburn, constipation, abdominal pain, hematemesis, hematochezia, melena, acholic stools  Genito-Urinary:  Dysuria, urgency, frequency, hematuria  Musculoskeletal:  Joint pain, joint stiffness, joint swelling, muscle pain Neurology:  Headache, focal neurological deficits, weakness, numbness, paresthesia  Derm:  Rashes, ulcers, excoriations, bruising  Extremities:  Decreased ROM, peripheral edema, mottling      OBJECTIVE:    BP (!) 136/96   Pulse 71   Temp 98.7 °F (37.1 °C) (Temporal)   Resp 18   Ht 5' 6\" (1.676 m)   Wt 218 lb (98.9 kg)   SpO2 96%   BMI 35.19 kg/m²     General Appearance:  awake, alert, and oriented to person, place, time, and purpose; appears stated age and cooperative; no apparent distress no labored breathing  HEENT:  NCAT; PERRL; conjunctiva pink, sclera clear mild left sided facial droop  Neck:  no adenopathy, bruit, JVD, tenderness, masses, or nodules; supple, symmetrical, trachea midline, thyroid not enlarged  Lung:  clear to auscultation bilaterally; no use of accessory muscles; no rhonchi, rales, or crackles  Heart:  regular rate and regular rhythm without murmur, rub, or gallop  Abdomen:  soft, nontender, nondistended; normoactive bowel sounds; no organomegaly  Extremities:  extremities normal, atraumatic, no cyanosis or edema  Musculokeletal:  no joint swelling, no muscle tenderness. ROM normal in all joints of extremities. 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 Check echocardiogram.  Neurochecks. PT OT speech. Permissive hypertension. Consider acute rehab. Patient and wife asking if he could go to acute rehab somewhere closer to where they live, in [place]. 24 hour repeat CT with no bleed. · Right-sided carotid stenosis- S/p carotid stenting 6/11  · Elevated troponin- denies chest pain. Continue to trend. EKG shows right bundle branch block. · ? Bibasilar opacities-atelectasis versus pneumonia. Procalcitonin negative. · Hypertension         DISPOSITION: Awaiting echo. Awaiting inpatient rehab to be set up closer to home    Medications:  REVIEWED DAILY    Infusion Medications    sodium chloride 100 mL/hr at 06/12/21 0000     Scheduled Medications    pantoprazole  40 mg Oral QAM AC    hydroCHLOROthiazide  25 mg Oral Daily    ticagrelor  90 mg Oral BID    aspirin  81 mg Oral Daily    sodium chloride flush  5-40 mL Intravenous 2 times per day    sennosides-docusate sodium  1 tablet Oral BID    atorvastatin  80 mg Oral Nightly    polyethylene glycol  17 g Oral Daily     PRN Meds: sodium chloride, sodium chloride flush, ondansetron **OR** ondansetron, labetalol, hydrALAZINE, perflutren lipid microspheres    Labs:     Recent Labs     06/12/21  0530 06/13/21  0515   WBC 12.0* 10.8   HGB 12.8 13.8   HCT 38.9 42.4    324       Recent Labs     06/12/21  0530 06/13/21  0515    140   K 4.1 3.8    104   CO2 21* 25   BUN 21 25*   CREATININE 1.1 1.3*   CALCIUM 8.9 8.9   PHOS 3.3 2.9       Recent Labs     06/12/21  0530   PROT 6.5   ALKPHOS 81   ALT 12   AST 23   BILITOT 0.4       No results for input(s): INR in the last 72 hours. No results for input(s): Torie Moraes in the last 72 hours. Chronic labs:    Lab Results   Component Value Date    CHOL 181 06/11/2021    TRIG 87 06/11/2021    HDL 51 06/11/2021    LDLCALC 113 (H) 06/11/2021    TSH 2.720 06/11/2021    INR 1.1 06/10/2021    LABA1C 5.6 06/11/2021       Radiology: REVIEWED DAILY    +++++++++++++++++++++++++++++++++++++++++++++++++  DO DO Don Watson Physician - Hospitalist  [address], [place]  +++++++++++++++++++++++++++++++++++++++++++++++++  NOTE: This report was transcribed using voice recognition software. Every effort was made to ensure accuracy; however, inadvertent computerized transcription errors may be present.

## 2021-06-14 NOTE — CARE COORDINATION
Spoke with Raciel, liaison with ARU at [facility]. They can accept the patient for transition of care planning for rehab. They will have a bed for the patient tomorrow. They will need a negative COVID from this admission and nurse to nurse called to [phone]. Clinical information to be faxed to [phone]. Raciel can be reached at [phone]. Call placed to [facility] and transportation arranged for 10:30 am pick-up tomorrow morning. Patient and wife at bedside notified.      Haleigh Herrera RN.  [facility] [place]  [phone]

## 2021-06-14 NOTE — PROGRESS NOTES
[address] [address] [address]      Date:2021  Patient Name: Dominick Guzman  MRN: [id number]  : 1940  Room: [facility]     Per OT Eval:    Evaluating OT: Kasandra Herrera, OTR/L [id number]     Referring Provider: ALEJANDRO Luciano - CNS   Specific Provider Orders/Date: OT eval and treat (6/10/21)        Diagnosis: Acute CVA; R temporal /R ICA      Reason for admission: evaluation of L sided weakness and slurred speech     Surgery/Procedures: s/p tPA      Pertinent Medical History: essential HTN, stroke      *Precautions:  Fall Risk, NPO, SBP<180, L sided weakness & inattention, mild aphasia and slurred speech   **pt goes by \"Melissa\"     Assessment of current deficits   [x]? Functional mobility          [x]? ADLs           [x]? Strength                  [x]? Cognition   [x]? Functional transfers        [x]? IADLs         [x]? Safety Awareness   [x]? Endurance   [x]? Fine Coordination           [x]? ROM           [x]? Vision/perception    [x]? Sensation     [x]? Gross Motor Coordination [x]? Balance    []? Delirium                  [x]? Motor Control  [x]?  Communication     OT PLAN OF CARE   OT POC based on physician orders, patient diagnosis and results of clinical assessment.        Frequency/Duration: 1-3 days/wk for 1-2 weeks PRN    Specific OT Treatment to include:   ADL retraining/adapted techniques and AE recommendations to increase functional independence within precautions                    Energy conservation techniques to improve tolerance for selfcare routine   Functional transfer/mobility training/DME recommendations for increased independence, safety and fall prevention         Patient/family education to increase safety and functional independence within precautions              Environmental modifications for safe mobility and completion of ADLs                           Cognitive retraining ex's to improve problem solving skills & safe participation in ADLs/IADLs  Sensory re-education techniques to improve extremity awareness, maintain skin integrity and improve hand function                             Visual/Perceptual retraining  to improve body awareness and safety during transfers and ADLs  Splinting/positioning needs to maintain joint/skin integrity and prevent contractures  Therapeutic activity to improve functional performance during ADLs/IADLs                                         Therapeutic exercise to improve tolerance and functional strength for ADLs   Balance retraining exercises/tasks for facilitation of postural control with dynamic challenges during ADLs . Positioning to improve functional independence  Neuromuscular re-education: facilitation of righting/equilibrium reactions, normalization muscle tone/facilitation active functional movement                      Delirium prevention/treatment     Modified [place] Scale   Score     Description  0             No symptoms  1             No significant disability despite symptoms  2             Slight disability; able to look after own affairs  3             Moderate disability; able to ambulate without assist/ requires assist with ADLs  4             Moderate/Severe disability;requires assist to ambulate/assist with ADLs  5             Severe disability;bedridden/incontinent   6               Score:   4    Recommended Adaptive Equipment: TBA: ADL AE, shower seat & rails, AD as indicated       Home Living: Pt lives with wife (out of town; children live locally)  in a split level home with 6 steps up/down with rail. bed/bath on upper level. Bathroom setup: walk in shower; tub/shower   Equipment owned: shower seat, WC, [employer], cane (does not use DME); pt owns LB dressing AE due to history of back surgery      Prior Level of Function: IND with ADLs;  IND with IADLs. No device for ambulation.    Driving: yes  Occupation: retired; works part time as      Pain Level: no pain      Cognition: A&O: 4/4    Follows 1-2 step commands, pleasant & cooperative, motivated              Memory: fair             Comprehension fair             Problem solving: fair             Judgement/safety: fair                Communication skills: mild dysarthria, word finding difficulties             Vision: decreased tracking to L lower quadrant; L sided inattention                    Glasses: none present                                                       Hearing: WFL              UE Assessment:  Hand Dominance: Right [x]? Left []?       ROM Strength STM goal: PRN   RUE  WFL 4/5        LUE WFL; impaired FMC and proprioception  4-/5 WFL; 4/5         Sensation:no c/o numbness/ tingling in  extremities (reports UE feels \"heavy\"); finger ID to light touch appropriate. Tone: WFL  Edema: min B hands      Functional Assessment: AM-PAC Daily Activity Raw Score: 15/24    Initial Eval Status  Date: 6/11 Treatment Status  Date:  6/14/21 STG=LTG  7-14  days    Feeding NPO  Supervision  Due to visual deficits                                    Louise  while seated up in chair to increase activity tolerance & hand function   (when cleared for diet)            Grooming Mod A  Min A  Seated at EOB                      SBA   while standing & demonstrating G knowledge of compensatory techniques; using L UE as fair functional assist.      UB dressing/bathing Mod A  Min A  Doff/abiola gown seated                        S; set up  demonstrating G initiation and follow through of mariusz dressing techniques and requiring min  cues for L sided body awareness during tasks.          LB dressing/bathing Dep;   decreased functional reach;    Max A   using AE  (seated up in chair; cues to use L UE during tasks)  Mod A  Overall, instructed pt on use of AE to doff & abiola socks with Good results     (pt had back surgery in March)                       Min A   using AE as needed

## 2021-06-14 NOTE — PROGRESS NOTES
Physical Therapy  Physical Therapy Rx Note     Name: Jocelyne Steele  : 1940  MRN: [id number]      Date of Service: 2021    Evaluating PT:  Christian Luciano, PT, DPT [id number]    Room #:  1651/6847-I  Diagnosis:  Acute CVA (cerebrovascular accident) [facility]) [I63.9]  PMHx/PSHx:  Essential HTN, Stroke  Procedure/Surgery:  6/10 tPA  Precautions:  Falls, L neglect, L hemiparesis, L visual deficits, mild aphasia   Equipment Needs:  TBD    SUBJECTIVE:    Pt lives with wife in a split-level home with 1 stairs to enter and no rail. 6 steps and 1 rail to each level. Pt ambulated without device and was independent PTA. Pt works part time as a . OBJECTIVE:   Initial Evaluation  Date: 21 Treatment  21 Short Term/ Long Term   Goals   AM-PAC 6 Clicks  78/01    Was pt agreeable to Eval/treatment? Yes yes    Does pt have pain?  \"Dull\" R BE No co pain     Bed Mobility  Rolling: NT  Supine to sit: MaxA  Sit to supine: NT  Scooting: MaxA Rolling min  Supine to sit min  Scooting min SBA   Transfers Sit to stand: ModA  Stand to sit: ModA  Stand pivot: ModA with HHA Sit to stand min  Stand to sit min  Stand pivot with ww min  SBA with AAD   Ambulation   50 feet with ModA with HHA 45 and 40 feet ww min assist  >150 feet with SBA with AAD   Stair negotiation: ascended and descended NT NT >4 steps with 1 rail SBA   ROM BUE:  Defer to OT note  BLE:  WFL     Strength BUE:  Defer to OT note  BLE:  4/5 grossly  Increase by 1/3 MMT grade   Balance Sitting EOB:  ModA dynamic  Dynamic Standing:  ModA with HHA  sitting eob sba  Dynamic standing min  Sitting EOB:  SBA  Dynamic Standing:  SBA with AAD     Pt is A & O x 4    Therapeutic Exercises:  Laq, ankle pumps, hip flexion , ue movement, cervical rom     Patient education  Pt educated on safety, transfers   Patient response to education:   Pt verbalized understanding Pt demonstrated skill Pt requires further education in this area   Partial  partial yes ASSESSMENT:    Conditions Requiring Skilled Therapeutic Intervention:    [x]Decreased strength     []Decreased ROM  [x]Decreased functional mobility  [x]Decreased balance   [x]Decreased endurance   [x]Decreased posture  [x]Decreased sensation  [x]Decreased coordination   [x]Decreased vision  [x]Decreased safety awareness   []Increased pain       Comments:  NP reported pt was stable for session. Pt was in bed upon arrival, Pt required cues and light assist for supine to sit. Pt sat eob then performed sit to stand with light assist with cues for hand placement. Pt ambulated with downward gaze, decrease gait speed and decrease step length. Pt unsteady. Pt demonstrates L inattention/neglect and required cues for environmental objects. Pt's spouse present during rx. Pt sat on toilet and required assist with hygiene. Pt returned to bedside chair and educated on call light and performed chair ex. Pt instructed to do intermittent through out the day. Treatment:  Patient practiced and was instructed in the following treatment:    Bed mobility training - pt given verbal and tactile cues to facilitate proper sequencing and safety during supine>sit as well as provided with physical assistance to complete task  Transfer training - pt was given verbal and tactile cues to facilitate proper hand placement, technique and safety during sit to stand and stand to sit as well as provided with physical assistance to complete task. Gait training- pt was given verbal and tactile cues to facilitate safety, balance and posture during ambulation as well as provided with physical assistance to complete task. Therapeutic activity  Pt education    Pt's/ family goals   1. Return to PLOF    Prognosis is good for reaching above PT goals. Patient and or family understand(s) diagnosis, prognosis, and plan of care.   Yes    PHYSICAL THERAPY PLAN OF CARE:    PT POC is established based on physician order and patient diagnosis Referring provider/PT Order:  Kimberly Fitch, DO / 06/10/21 1500 PT eval and treat  Diagnosis:  Acute CVA (cerebrovascular accident) ([facility] [address] [I63.9]  Specific instructions for next treatment:  Progress ambulation and functional mobility    Current Treatment Recommendations:     [x] Strengthening to improve independence with functional mobility   [] ROM to improve independence with functional mobility   [x] Balance Training to improve static/dynamic balance and to reduce fall risk  [x] Endurance Training to improve activity tolerance during functional mobility   [x] Transfer Training to improve safety and independence with all functional transfers   [x] Gait Training to improve gait mechanics, endurance and asses need for appropriate assistive device  [x] Stair Training in preparation for safe discharge home and/or into the community   [] Positioning to prevent skin breakdown and contractures  [x] Safety and Education Training   [x] Patient/Caregiver Education   [] HEP  [] Other     PT long term treatment goals are located in above grid    Frequency of treatments: 2-5x/week x 1-2 weeks.     Time in  850  Time out  922     Total Treatment Time  32 minutes       CPT codes:  [] Low Complexity PT evaluation 81837  [] Moderate Complexity PT evaluation 71406  [] High Complexity PT evaluation 99682  [] PT Re-evaluation 16642  [] Gait training 24517 - minutes  [] Manual therapy 66721 - minutes  [x] Therapeutic activities 67491 32 minutes  [] Therapeutic exercises 68414 - minutes  [] Neuromuscular reeducation 30707 - minutes     Javier Dasilva, PTA  [id number]

## 2021-06-15 VITALS
HEART RATE: 91 BPM | WEIGHT: 218 LBS | RESPIRATION RATE: 16 BRPM | OXYGEN SATURATION: 98 % | TEMPERATURE: 98 F | DIASTOLIC BLOOD PRESSURE: 89 MMHG | HEIGHT: 66 IN | SYSTOLIC BLOOD PRESSURE: 149 MMHG | BODY MASS INDEX: 35.03 KG/M2

## 2021-06-15 PROCEDURE — 6370000000 HC RX 637 (ALT 250 FOR IP): Performed by: NURSE PRACTITIONER

## 2021-06-15 RX ORDER — ASPIRIN 81 MG/1
81 TABLET ORAL DAILY
Qty: 30 TABLET | Refills: 3 | Status: SHIPPED | OUTPATIENT
Start: 2021-06-16

## 2021-06-15 RX ORDER — PANTOPRAZOLE SODIUM 40 MG/1
40 TABLET, DELAYED RELEASE ORAL
Qty: 30 TABLET | Refills: 3 | Status: SHIPPED | OUTPATIENT
Start: 2021-06-16

## 2021-06-15 RX ORDER — ATORVASTATIN CALCIUM 80 MG/1
80 TABLET, FILM COATED ORAL NIGHTLY
Qty: 30 TABLET | Refills: 3 | Status: SHIPPED | OUTPATIENT
Start: 2021-06-15

## 2021-06-15 RX ADMIN — HYDROCHLOROTHIAZIDE 25 MG: 25 TABLET ORAL at 08:47

## 2021-06-15 RX ADMIN — TICAGRELOR 90 MG: 90 TABLET ORAL at 08:47

## 2021-06-15 RX ADMIN — PANTOPRAZOLE SODIUM 40 MG: 40 TABLET, DELAYED RELEASE ORAL at 05:57

## 2021-06-15 RX ADMIN — ASPIRIN 81 MG: 81 TABLET, COATED ORAL at 08:47

## 2021-06-15 ASSESSMENT — PAIN SCALES - GENERAL: PAINLEVEL_OUTOF10: 0

## 2021-06-15 NOTE — CARE COORDINATION
Perfect Serve message sent to Dr Ambar Moss re: transition of care plan and patient set up to transfer to [facility] at 10:30 am via [facility].       Mehrdad Meredith RN.  [facility]  [phone]

## 2021-06-15 NOTE — PROGRESS NOTES
Nurse to nurse given to anneliese at [address] [place] [address] rehab.  All discharge information faxed to [phone]

## 2021-06-15 NOTE — PLAN OF CARE
Problem: Falls - Risk of:  Goal: Will remain free from falls  Description: Will remain free from falls  Outcome: Ongoing  Goal: Absence of physical injury  Description: Absence of physical injury  Outcome: Ongoing     Problem: HEMODYNAMIC STATUS  Goal: Patient has stable vital signs and fluid balance  Outcome: Ongoing     Problem: ACTIVITY INTOLERANCE/IMPAIRED MOBILITY  Goal: Mobility/activity is maintained at optimum level for patient  Outcome: Ongoing     Problem: COMMUNICATION IMPAIRMENT  Goal: Ability to express needs and understand communication  Outcome: Ongoing     Problem: Discharge Planning:  Goal: Participates in care planning  Description: Participates in care planning  Outcome: Ongoing  Goal: Discharged to appropriate level of care  Description: Discharged to appropriate level of care  Outcome: Ongoing     Problem: Aspiration:  Goal: Absence of aspiration  Description: Absence of aspiration  Outcome: Ongoing     Problem:  Bowel Function - Altered:  Goal: Bowel elimination is within specified parameters  Description: Bowel elimination is within specified parameters  Outcome: Ongoing     Problem: Cardiac Output - Decreased:  Goal: Hemodynamic stability will improve  Description: Hemodynamic stability will improve  Outcome: Ongoing     Problem: Fluid Volume - Imbalance:  Goal: Absence of imbalanced fluid volume signs and symptoms  Description: Absence of imbalanced fluid volume signs and symptoms  Outcome: Ongoing     Problem: Mental Status - Impaired:  Goal: Mental status will be restored to baseline  Description: Mental status will be restored to baseline  Outcome: Ongoing     Problem: Pain:  Goal: Recognizes and communicates pain  Description: Recognizes and communicates pain  Outcome: Ongoing  Goal: Control of acute pain  Description: Control of acute pain  Outcome: Ongoing     Problem: Skin Integrity - Impaired:  Goal: Will show no infection signs and symptoms  Description: Will show no infection signs and symptoms  Outcome: Ongoing  Goal: Absence of new skin breakdown  Description: Absence of new skin breakdown  Outcome: Ongoing     Problem: Sleep Pattern Disturbance:  Goal: Appears well-rested  Description: Appears well-rested  Outcome: Ongoing     Problem: Tissue Perfusion, Altered:  Goal: Circulatory function within specified parameters  Description: Circulatory function within specified parameters  Outcome: Ongoing     Problem: Tissue Perfusion - Cardiopulmonary, Altered:  Goal: Hemodynamic stability will improve  Description: Hemodynamic stability will improve  Outcome: Ongoing     Problem: Pain:  Goal: Control of acute pain  Description: Control of acute pain  Outcome: Ongoing     Problem: Neurological  Goal: Maximum potential motor/sensory/cognitive function  Outcome: Ongoing

## 2021-06-15 NOTE — PROGRESS NOTES
Hospitalist Progress Note      PCP: No primary care provider on file. Date of Admission: 6/10/2021    Chief Complaint: * LEFT SIDED WEAKNESS,       Hospital Course: * FOUND TO HAVE AN ACUTE RIGHT TEMP LOBE CVA. He received TPA in ER, GOING TO [facility] EARLY THIS MORNING [place] TO BE NEAR FAMILY     **     Subjective: **LEFT BEFORE COULD BE SEEN       Medications:  Reviewed    Infusion Medications   Scheduled Medications   PRN Meds:       Intake/Output Summary (Last 24 hours) at 6/15/2021 1432  Last data filed at 6/15/2021 1139  Gross per 24 hour   Intake 420 ml   Output 400 ml   Net 20 ml       Exam:    BP (!) 149/89   Pulse 91   Temp 98 °F (36.7 °C) (Temporal)   Resp 16   Ht 5' 6\" (1.676 m)   Wt 218 lb (98.9 kg)   SpO2 98%   BMI 35.19 kg/m²             Labs:   Recent Labs     06/13/21  0515   WBC 10.8   HGB 13.8   HCT 42.4        Recent Labs     06/13/21  0515      K 3.8      CO2 25   BUN 25*   CREATININE 1.3*   CALCIUM 8.9   PHOS 2.9     No results for input(s): AST, ALT, BILIDIR, BILITOT, ALKPHOS in the last 72 hours. No results for input(s): INR in the last 72 hours. No results for input(s): Earnie Lent in the last 72 hours. No results for input(s): AST, ALT, ALB, BILIDIR, BILITOT, ALKPHOS in the last 72 hours. No results for input(s): LACTA in the last 72 hours. No results found for: Rockford Pont  No results found for: AMMONIA    Assessment:    Active Hospital Problems    Diagnosis Date Noted    Cerebrovascular accident (CVA) ([facility] [address] [I63.9] 06/10/2021   s/p tpa   right sided carotid stenosis, had stenting 6 11   htn      Plan:   To [facility] in [facility]      Electronically signed by Hira Donahue DO on 6/15/2021 at 2:32 PM [facility]

## 2021-06-15 NOTE — DISCHARGE INSTR - COC
Continuity of Care Form    Patient Name: Elicia Brumfield   :  1940  MRN:  [id number]    Admit date:  6/10/2021  Discharge date:  6/15/21    Code Status Order: Full Code   Advance Directives:   [facility] Documentation       Date/Time Healthcare Directive Type of Healthcare Directive Copy in [facility] [address] Agent's Name Healthcare Agent's Phone Number    06/10/21 1800  Yes, patient has an advance directive for healthcare treatment  Hannah Guerrero            Admitting Physician:  Cole Brown DO  PCP: No primary care provider on file. Discharging Nurse: Rex [facility] Unit/Room#: 4082/8758-I  Discharging Unit Phone Number: [phone]    Emergency Contact:   Extended Emergency Contact Information  Primary Emergency Contact: jaden monahan  Home Phone: [phone]  Relation: Spouse   needed? No  Secondary Emergency Contact: Reece Floodlight  Work Phone: [phone]  Relation: Child    Past Surgical History:  Past Surgical History:   Procedure Laterality Date    IR CAROTID STENT UNI W PROTECTION  2021    IR CAROTID STENT UNI W PROTECTION 2021 Naomi Kenney MD [facility]       Immunization History: There is no immunization history on file for this patient.     Active Problems:  Patient Active Problem List   Diagnosis Code    Cerebrovascular accident (CVA) ([facility] [address] I63.9       Isolation/Infection:   Isolation            No Isolation          Patient Infection Status       None to display            Nurse Assessment:  Last Vital Signs: BP (!) 150/73   Pulse 86   Temp 98.2 °F (36.8 °C) (Temporal)   Resp 18   Ht 5' 6\" (1.676 m)   Wt 218 lb (98.9 kg)   SpO2 97%   BMI 35.19 kg/m²     Last documented pain score (0-10 scale): Pain Level: 0  Last Weight:   Wt Readings from Last 1 Encounters:   06/10/21 218 lb (98.9 kg)     Mental Status:  oriented, alert, coherent, logical, thought processes intact and able to concentrate and follow conversation    IV Access:  - None    Nursing Mobility/ADLs:  Walking   Independent  Transfer  Independent  Bathing  Independent  Dressing  Independent  Toileting  Independent  Feeding  Independent  Med Admin  Independent  Med Delivery   whole    Wound Care Documentation and Therapy:  Wound 06/13/21 Femoral Anterior;Proximal;Right (Active)   Dressing Status Dry; Intact; Old drainage noted 06/14/21 2300   Wound Cleansed Not Cleansed 06/14/21 2300   Number of days: 2        Elimination:  Continence:   · Bowel: Yes  · Bladder: Yes  Urinary Catheter: None   Colostomy/Ileostomy/Ileal Conduit: No       Date of Last BM: 6/14/21    Intake/Output Summary (Last 24 hours) at 6/15/2021 0747  Last data filed at 6/14/2021 1440  Gross per 24 hour   Intake 420 ml   Output 600 ml   Net -180 ml     I/O last 3 completed shifts: In: 5 [P.O.:420]  Out: 600 [Urine:600]    Safety Concerns:     None    Impairments/Disabilities:      None    Nutrition Therapy:  Current Nutrition Therapy:   - Oral Diet:  Low Fat    Routes of Feeding: Oral  Liquids: Thin Liquids  Daily Fluid Restriction: no  Last Modified Barium Swallow with Video (Video Swallowing Test): not done    Treatments at the Time of Hospital Discharge:   Respiratory Treatments: ***  Oxygen Therapy:  is not on home oxygen therapy.   Ventilator:    - No ventilator support    Rehab Therapies: Physical Therapy and Occupational Therapy  Weight Bearing Status/Restrictions: No weight bearing restirctions  Other Medical Equipment (for information only, NOT a DME order):  {EQUIPMENT:081767708}  Other Treatments: HOME CPAP settings at 9    Patient's personal belongings (please select all that are sent with patient):  Glasses    RN SIGNATURE:  Electronically signed by Britt Vega RN on 6/15/21 at 10:09 AM EDT    CASE MANAGEMENT/SOCIAL WORK SECTION    Inpatient Status Date: ***    Readmission Risk Assessment Score:  Readmission Risk              Risk of Unplanned Readmission:  12           Discharging to Facility/ Agency   · Name:   · Address:  · Phone:  · Fax:    Dialysis Facility (if applicable)   · Name:  · Address:  · Dialysis Schedule:  · Phone:  · Fax:    / signature: {Esignature:084883022}    PHYSICIAN SECTION    Prognosis: {Prognosis:1734055477}    Condition at Discharge: [address] Patient Condition:805651556}    Rehab Potential (if transferring to Rehab): {Prognosis:6086382385}    Recommended Labs or Other Treatments After Discharge: ***    Physician Certification: I certify the above information and transfer of Alirio Thomas  is necessary for the continuing treatment of the diagnosis listed and that he requires {Admit to Appropriate Level of Care:23113} for {GREATER/LESS:742572322} 30 days.      Update Admission H&P: {CHP DME Changes in RAUVR:742082563}    PHYSICIAN SIGNATURE:  Electronically signed by Ramu Cervantes DO on 6/15/21 at 9:34 AM EDT

## 2021-06-17 NOTE — DISCHARGE SUMMARY
Hospital Medicine Discharge Summary    Patient: Henrique Gupta     Gender: male  : 1940   Age: 80 y.o. MRN: [id number]    Code Status:  Full code    Primary Care Provider: No primary care provider on file. Admit Date: 6/10/2021   Discharge Date: 6/15/2021      Admitting Physician: No admitting provider for patient encounter. Discharge Physician: Marco Davenport,      Discharge Diagnoses: Active Hospital Problems    Diagnosis Date Noted    Cerebrovascular accident (CVA) ([facility] [address] [I63.9] 06/10/2021   s/p tpa   right sided carotid stenosis, had stenting    htn    Hospital Course:   *LEFT SIDED WEAKNESS,  was the presentation* FOUND TO HAVE AN ACUTE RIGHT TEMP LOBE CVA. He received TPA in ER, GOING TO [facility] EARLY THIS MORNING [place] TO BE NEAR FAMILY **    Disposition:  [facility]    Exam:     BP (!) 149/89   Pulse 91   Temp 98 °F (36.7 °C) (Temporal)   Resp 16   Ht 5' 6\" (1.676 m)   Wt 218 lb (98.9 kg)   SpO2 98%   BMI 35.19 kg/m²   Consults:     IP CONSULT TO INTERNAL MEDICINE  IP CONSULT TO CRITICAL CARE  IP CONSULT TO INTERNAL MEDICINE  IP CONSULT TO CRITICAL CARE  IP CONSULT TO PHYSICAL MEDICINE REHAB  IP CONSULT TO PHYSICAL MEDICINE REHAB    Labs:  For convenience and continuity at follow-up the following most recent labs are provided:    Lab Results   Component Value Date    WBC 10.8 2021    HGB 13.8 2021    HCT 42.4 2021    MCV 91.6 2021     2021     2021    K 3.8 2021     2021    CO2 25 2021    BUN 25 2021    CREATININE 1.3 2021    CALCIUM 8.9 2021    PHOS 2.9 2021    ALKPHOS 81 2021    ALT 12 2021    AST 23 2021    BILITOT 0.4 2021    LABALBU 3.4 2021    LDLCALC 113 2021    TRIG 87 2021     Lab Results   Component Value Date    INR 1.1 06/10/2021       Radiology:  CT HEAD WO CONTRAST   Final Result   Diffuse atrophy likely age related Findings compatible with small vessel ischemic changes. Stable right posterior frontal infarct   No evidence for hemorrhage         IR CAROTID STENT W PROTECTION   Final Result   1. Angiogram demonstrates successful placement of a carotid stent with   subsequent angioplasty         CT HEAD WO CONTRAST   Final Result   No acute intracranial abnormality. No intracranial hemorrhage. XR CHEST PORTABLE   Final Result   Bibasilar pulmonary opacities may represent atelectasis or pneumonia. CTA HEAD W CONTRAST   Final Result   1. Severe atherosclerotic disease . 2. Estimated stenosis of the proximal right and left internal carotid   artery by NASCET criteria is greater than 90% on the right         This study was analyzed by the [employer] algorithm. CTA NECK W CONTRAST   Final Result   1. Severe atherosclerotic disease . 2. Estimated stenosis of the proximal right and left internal carotid   artery by NASCET criteria is greater than 90% on the right         This study was analyzed by the [employer] algorithm. CT BRAIN PERFUSION   Final Result      Findings suspicious for small region of ischemia in the right temporal   lobe      This study was analyzed by the [employer] algorithm. CT HEAD WO CONTRAST   Final Result   No acute intracranial abnormality.              Discharge Medications:   Discharge Medication List as of 6/15/2021 10:09 AM      START taking these medications    Details   aspirin 81 MG EC tablet Take 1 tablet by mouth daily, Disp-30 tablet, R-3Normal      atorvastatin (LIPITOR) 80 MG tablet Take 1 tablet by mouth nightly, Disp-30 tablet, R-3Normal      ticagrelor (BRILINTA) 90 MG TABS tablet Take 1 tablet by mouth 2 times daily, Disp-60 tablet, R-0Normal      pantoprazole (PROTONIX) 40 MG tablet Take 1 tablet by mouth every morning (before breakfast), Disp-30 tablet, R-3Normal           Discharge Medication List as of 6/15/2021 10:09 AM        Discharge Medication List as of 6/15/2021 10:09 AM      CONTINUE these medications which have NOT CHANGED    Details   vitamin D (ERGOCALCIFEROL) 1.25 MG (65327 UT) CAPS capsule Take 50,000 Units by mouth once a weekHistorical Med      hydroCHLOROthiazide (HYDRODIURIL) 25 MG tablet Take 25 mg by mouth dailyHistorical Med           Discharge Medication List as of 6/15/2021 10:09 AM          Follow-up appointments:  1 week    Provider Follow-up:    pmd    Condition at Discharge:  Stable    The patient was NOT  seen and examined on day of discharge Time Spent on discharge is30  i  Signed:    Tamika Cordova   6/17/2021      Thank you No primary care provider on file. for the opportunity to be involved in this patient's care.  If you have any questions or concerns please feel free to contact me at [phone]

## 2021-06-17 NOTE — DISCHARGE SUMMARY
Hospital Medicine Discharge Summary     Patient: Kamlesh Salvador      Gender: male  : 1940            Age: 80 y.o. MRN: [id number]     Code Status:  Full code     Primary Care Provider: No primary care provider on file.     Admit Date: 6/10/2021   Discharge Date: 6/15/2021       Admitting Physician: No admitting provider for patient encounter. Discharge Physician: Damian Patel, DO      Discharge Diagnoses:          Active Hospital Problems     Diagnosis Date Noted    Cerebrovascular accident (CVA) [facility]) [I63.9] 06/10/2021   s/p tpa   right sided carotid stenosis, had stenting    htn     Hospital Course:   *LEFT SIDED WEAKNESS,  was the presentation* FOUND TO HAVE AN ACUTE RIGHT TEMP LOBE CVA.  He received TPA in ER, GOING TO [facility] EARLY THIS MORNING [place] TO BE NEAR FAMILY **     Disposition:  [facility]     Exam:      BP (!) 149/89   Pulse 91   Temp 98 °F (36.7 °C) (Temporal)   Resp 16   Ht 5' 6\" (1.676 m)   Wt 218 lb (98.9 kg)   SpO2 98%   BMI 35.19 kg/m²   Consults:      IP CONSULT TO INTERNAL MEDICINE  IP CONSULT TO CRITICAL CARE  IP CONSULT TO INTERNAL MEDICINE  IP CONSULT TO CRITICAL CARE  IP CONSULT TO PHYSICAL MEDICINE REHAB  IP CONSULT TO PHYSICAL MEDICINE REHAB     Labs:  For convenience and continuity at follow-up the following most recent labs are provided:           Lab Results   Component Value Date     WBC 10.8 2021     HGB 13.8 2021     HCT 42.4 2021     MCV 91.6 2021      2021      2021     K 3.8 2021      2021     CO2 25 2021     BUN 25 2021     CREATININE 1.3 2021     CALCIUM 8.9 2021     PHOS 2.9 2021     ALKPHOS 81 2021     ALT 12 2021     AST 23 2021     BILITOT 0.4 2021     LABALBU 3.4 2021     LDLCALC 113 2021     TRIG 87 2021            Lab Results   Component Value Date     INR 1.1 06/10/2021         Radiology:  CT HEAD WO CONTRAST Final Result   Diffuse atrophy likely age related   Findings compatible with small vessel ischemic changes. Stable right posterior frontal infarct   No evidence for hemorrhage           IR CAROTID STENT W PROTECTION   Final Result   1. Angiogram demonstrates successful placement of a carotid stent with   subsequent angioplasty           CT HEAD WO CONTRAST   Final Result   No acute intracranial abnormality.       No intracranial hemorrhage.           XR CHEST PORTABLE   Final Result   Bibasilar pulmonary opacities may represent atelectasis or pneumonia.           CTA HEAD W CONTRAST   Final Result   1. Severe atherosclerotic disease . 2. Estimated stenosis of the proximal right and left internal carotid   artery by NASCET criteria is greater than 90% on the right           This study was analyzed by the [employer] algorithm.                   CTA NECK W CONTRAST   Final Result   1. Severe atherosclerotic disease . 2. Estimated stenosis of the proximal right and left internal carotid   artery by NASCET criteria is greater than 90% on the right           This study was analyzed by the [employer] algorithm.                   CT BRAIN PERFUSION   Final Result       Findings suspicious for small region of ischemia in the right temporal   lobe       This study was analyzed by the [employer] algorithm.               CT HEAD WO CONTRAST   Final Result   No acute intracranial abnormality.                 Discharge Medications:       Discharge Medication List as of 6/15/2021 10:09 AM           START taking these medications     Details   aspirin 81 MG EC tablet Take 1 tablet by mouth daily, Disp-30 tablet, R-3Normal       atorvastatin (LIPITOR) 80 MG tablet Take 1 tablet by mouth nightly, Disp-30 tablet, R-3Normal       ticagrelor (BRILINTA) 90 MG TABS tablet Take 1 tablet by mouth 2 times daily, Disp-60 tablet, R-0Normal       pantoprazole (PROTONIX) 40 MG tablet Take 1 tablet by mouth every morning (before breakfast), Disp-30 tablet, R-3Normal              Discharge Medication List as of 6/15/2021 10:09 AM              Discharge Medication List as of 6/15/2021 10:09 AM           CONTINUE these medications which have NOT CHANGED     Details   vitamin D (ERGOCALCIFEROL) 1.25 MG (14613 UT) CAPS capsule Take 50,000 Units by mouth once a weekHistorical Med       hydroCHLOROthiazide (HYDRODIURIL) 25 MG tablet Take 25 mg by mouth dailyHistorical Med              Discharge Medications    Discharge Medication List as of 6/15/2021 10:09 AM                Follow-up appointments:  1 week     Provider Follow-up:    pmd     Condition at Discharge:  Stable     The patient was NOT  seen and examined on day of discharge Time Spent on discharge is30  i  Signed:     Tamika Boo             6/17/2021        Thank you No primary care provider on file. for the opportunity to be involved in this patient's care.  If you have any questions or concerns please feel free to contact me at [phone]

## 2021-09-19 NOTE — PROGRESS NOTES
Juan Velazquez is a 80 y.o. right handed male     Neurology is following for stroke. PMH: Hypertension    Patient presented to ED on 6/10 with left-sided weakness and numbness that began around 10:30 AM at work. Patient also had difficulty speaking. His initial NIH was 7. CT head showed no acute pathology. CTA head and neck showed severe atherosclerotic disease and 90% stenosis in the right ICA. CT perfusion showed questionable ischemia in the right temporal lobe. Telestroke was consulted and patient was given TPA. Mechanism of stroke is likely due to high-grade right ICA stenosis. Patient subsequently went to IR to have stent placement. Repeat CT head after stent placement showed no hemorrhage. At present time patient has residual left-sided weakness but improved since arrival.    Patient's wife is at bedside. Patient remains hypertensive otherwise vital stable    Objective:     BP (!) 153/62   Pulse 81   Temp 97.8 °F (36.6 °C) (Temporal)   Resp 14   Ht 5' 6\" (1.676 m)   Wt 218 lb (98.9 kg)   SpO2 96%   BMI 35.19 kg/m²     General appearance: alert, appears stated age, cooperative and no distress  Head: normocephalic, without obvious abnormality, atraumatic  Eyes: conjunctivae/corneas clear.  .  Neck: supple, symmetrical, trachea midline and thyroid not enlarged  Lungs: Unlabored breaths  Heart: regular rate and rhythm, NSR on telemetry  Extremities: normal, atraumatic, no cyanosis or edema  Pulses: 2+ and symmetric  Skin: color, texture, turgor normal---no rashes or lesions      Mental Status: Alert and oriented x4, resting quietly in bed, follows commands well    Appropriate attention/concentration  Intact fundus of knowledge  Intact memories    Speech: no dysarthria  Language: no aphasias    Cranial Nerves:  I: smell NA   II: visual acuity  NA   II: visual fields  blinks to threat bilaterally   II: pupils PERRL   III,VII: ptosis None   III,IV,VI: extraocular muscles   EOMI without SPECGRAV 1.015 06/10/2021    LEUKOCYTESUR Negative 06/10/2021    UROBILINOGEN 0.2 06/10/2021    BILIRUBINUR Negative 06/10/2021    BLOODU Negative 06/10/2021    GLUCOSEU Negative 06/10/2021     HgBA1c:    Lab Results   Component Value Date    LABA1C 5.6 06/11/2021     FLP:    Lab Results   Component Value Date    TRIG 87 06/11/2021    HDL 51 06/11/2021    LDLCALC 113 06/11/2021    LABVLDL 17 06/11/2021     TSH:    Lab Results   Component Value Date    TSH 2.720 06/11/2021     CT HEAD WO CONTRAST   Final Result   Diffuse atrophy likely age related   Findings compatible with small vessel ischemic changes. Stable right posterior frontal infarct   No evidence for hemorrhage         IR CAROTID STENT W PROTECTION   Final Result   1. Angiogram demonstrates successful placement of a carotid stent with   subsequent angioplasty         CT HEAD WO CONTRAST   Final Result   No acute intracranial abnormality. No intracranial hemorrhage. XR CHEST PORTABLE   Final Result   Bibasilar pulmonary opacities may represent atelectasis or pneumonia. CTA HEAD W CONTRAST   Final Result   1. Severe atherosclerotic disease . 2. Estimated stenosis of the proximal right and left internal carotid   artery by NASCET criteria is greater than 90% on the right         This study was analyzed by the [employer]. ai algorithm. CTA NECK W CONTRAST   Final Result   1. Severe atherosclerotic disease . 2. Estimated stenosis of the proximal right and left internal carotid   artery by NASCET criteria is greater than 90% on the right         This study was analyzed by the [employer]. ai algorithm. CT BRAIN PERFUSION   Final Result      Findings suspicious for small region of ischemia in the right temporal   lobe      This study was analyzed by the [employer]. ai algorithm. CT HEAD WO CONTRAST   Final Result   No acute intracranial abnormality.              All labs and images personally reviewed today    Assessment: Patient presented with left-sided weakness and slurred speech   CT perfusion suspicious for right temporal lobe ischemia   CTA head and neck showed approximately 90% stenosis in the right ICA   Patient status post TPA and stent placement   Subsequent CT head shows stable right posterior frontal infarct with no hemorrhage   Patient has left hemiparesis on exam but improved since arrival.      Plan:     Continue supportive care  Continue DAPT and statin   A1c 5.6,   Stroke education  Continue PT/OT/ST  Continue telemetry  SCDs  Fall precautions    Okay to discharge to rehab once bed available and patient is medically stable. Patient should follow-up in stroke clinic after rehab discharge. Neuro will sign off.     ALEJANDRO Dial - NP-ROCIO    9:55 AM  6/12/2021 19-Sep-2021 20:10